# Patient Record
Sex: MALE | Race: WHITE | Employment: FULL TIME | URBAN - METROPOLITAN AREA
[De-identification: names, ages, dates, MRNs, and addresses within clinical notes are randomized per-mention and may not be internally consistent; named-entity substitution may affect disease eponyms.]

---

## 2018-01-27 PROBLEM — Z98.890 POSTSURGICAL STATE, EYE: Status: ACTIVE | Noted: 2018-01-27

## 2018-01-27 PROBLEM — H04.123 DRY EYE SYNDROME OF BILATERAL LACRIMAL GLANDS: Status: ACTIVE | Noted: 2018-01-27

## 2018-01-27 PROBLEM — H40.003 GLAUCOMA SUSPECT, BILATERAL: Status: ACTIVE | Noted: 2018-01-27

## 2018-08-04 PROCEDURE — 82043 UR ALBUMIN QUANTITATIVE: CPT | Performed by: FAMILY MEDICINE

## 2018-08-04 PROCEDURE — 82570 ASSAY OF URINE CREATININE: CPT | Performed by: FAMILY MEDICINE

## 2021-12-31 ENCOUNTER — HOSPITAL ENCOUNTER (EMERGENCY)
Age: 46
Discharge: HOME | End: 2021-12-31
Attending: EMERGENCY MEDICINE
Payer: COMMERCIAL

## 2021-12-31 VITALS
TEMPERATURE: 98 F | RESPIRATION RATE: 18 BRPM | DIASTOLIC BLOOD PRESSURE: 101 MMHG | SYSTOLIC BLOOD PRESSURE: 151 MMHG | BODY MASS INDEX: 43.69 KG/M2 | HEART RATE: 102 BPM | HEIGHT: 69 IN | OXYGEN SATURATION: 98 % | WEIGHT: 295 LBS

## 2021-12-31 DIAGNOSIS — T14.8XXA PUNCTURE WOUND: Primary | ICD-10-CM

## 2021-12-31 PROCEDURE — 99283 EMERGENCY DEPT VISIT LOW MDM: CPT

## 2022-01-01 NOTE — ED PROVIDER NOTES
Patient Seen in: [facility] Emergency Department In [place]      History   Patient presents with:  Laceration/Abrasion    Stated Complaint: finger laceration    Subjective:   HPI    73-year-old male presents with an injury to the second digit of the left visit.        Physical Exam    General:  Vitals as listed. No acute distress   Extremities: There is a puncture wound to the distal aspect of the second digit of the left hand just lateral to the nail. No injury to the nail itself.  Low-grade venous oozing

## 2022-11-29 ENCOUNTER — HOSPITAL ENCOUNTER (EMERGENCY)
Age: 47
Discharge: HOME | End: 2022-11-29
Attending: EMERGENCY MEDICINE
Payer: COMMERCIAL

## 2022-11-29 ENCOUNTER — APPOINTMENT (OUTPATIENT)
Dept: GENERAL RADIOLOGY | Age: 47
End: 2022-11-29
Attending: EMERGENCY MEDICINE
Payer: COMMERCIAL

## 2022-11-29 VITALS
DIASTOLIC BLOOD PRESSURE: 71 MMHG | SYSTOLIC BLOOD PRESSURE: 136 MMHG | BODY MASS INDEX: 47 KG/M2 | HEART RATE: 87 BPM | RESPIRATION RATE: 18 BRPM | WEIGHT: 300 LBS | TEMPERATURE: 98 F | OXYGEN SATURATION: 98 %

## 2022-11-29 DIAGNOSIS — S80.12XA CONTUSION OF LEFT LOWER EXTREMITY, INITIAL ENCOUNTER: Primary | ICD-10-CM

## 2022-11-29 PROCEDURE — 99283 EMERGENCY DEPT VISIT LOW MDM: CPT

## 2022-11-29 PROCEDURE — 73590 X-RAY EXAM OF LOWER LEG: CPT | Performed by: EMERGENCY MEDICINE

## 2022-11-29 NOTE — ED INITIAL ASSESSMENT (HPI)
Pt presents to ed w/ trip and fall one week ago. Denies hitting head and loc/ C/o bruising to LLE.  Takes 325 aspirin

## 2024-06-19 ENCOUNTER — APPOINTMENT (OUTPATIENT)
Dept: CT IMAGING | Facility: HOSPITAL | Age: 49
DRG: 300 | End: 2024-06-19
Attending: HOSPITALIST

## 2024-06-19 ENCOUNTER — APPOINTMENT (OUTPATIENT)
Dept: ULTRASOUND IMAGING | Age: 49
DRG: 300 | End: 2024-06-19
Attending: EMERGENCY MEDICINE

## 2024-06-19 ENCOUNTER — HOSPITAL ENCOUNTER (INPATIENT)
Facility: HOSPITAL | Age: 49
LOS: 1 days | Discharge: HOME | DRG: 300 | End: 2024-06-20
Attending: EMERGENCY MEDICINE | Admitting: INTERNAL MEDICINE

## 2024-06-19 DIAGNOSIS — I82.C12 ACUTE EMBOLISM AND THROMBOSIS OF LEFT INTERNAL JUGULAR VEIN (HCC): Primary | ICD-10-CM

## 2024-06-19 LAB
ANION GAP SERPL CALC-SCNC: 6 MMOL/L (ref 0–18)
ANION GAP SERPL CALC-SCNC: 7 MMOL/L (ref 0–18)
APTT PPP: 130.6 SECONDS (ref 23–36)
APTT PPP: 28 SECONDS (ref 23–36)
BASOPHILS # BLD AUTO: 0.09 X10(3) UL (ref 0–0.2)
BASOPHILS NFR BLD AUTO: 0.6 %
BUN BLD-MCNC: 12 MG/DL (ref 9–23)
BUN BLD-MCNC: 13 MG/DL (ref 9–23)
CALCIUM BLD-MCNC: 9 MG/DL (ref 8.5–10.1)
CALCIUM BLD-MCNC: 9.7 MG/DL (ref 8.5–10.1)
CHLORIDE SERPL-SCNC: 103 MMOL/L (ref 98–112)
CHLORIDE SERPL-SCNC: 106 MMOL/L (ref 98–112)
CO2 SERPL-SCNC: 23 MMOL/L (ref 21–32)
CO2 SERPL-SCNC: 24 MMOL/L (ref 21–32)
CREAT BLD-MCNC: 0.79 MG/DL
CREAT BLD-MCNC: 0.8 MG/DL
EGFRCR SERPLBLD CKD-EPI 2021: 110 ML/MIN/1.73M2 (ref 60–?)
EGFRCR SERPLBLD CKD-EPI 2021: 110 ML/MIN/1.73M2 (ref 60–?)
EOSINOPHIL # BLD AUTO: 0.36 X10(3) UL (ref 0–0.7)
EOSINOPHIL NFR BLD AUTO: 2.6 %
ERYTHROCYTE [DISTWIDTH] IN BLOOD BY AUTOMATED COUNT: 15.7 %
ERYTHROCYTE [DISTWIDTH] IN BLOOD BY AUTOMATED COUNT: 15.8 %
GLUCOSE BLD-MCNC: 102 MG/DL (ref 70–99)
GLUCOSE BLD-MCNC: 109 MG/DL (ref 70–99)
HCT VFR BLD AUTO: 42.2 %
HCT VFR BLD AUTO: 43.4 %
HGB BLD-MCNC: 13.7 G/DL
HGB BLD-MCNC: 13.8 G/DL
IMM GRANULOCYTES # BLD AUTO: 0.1 X10(3) UL (ref 0–1)
IMM GRANULOCYTES NFR BLD: 0.7 %
INR BLD: 0.99 (ref 0.8–1.2)
LYMPHOCYTES # BLD AUTO: 2.65 X10(3) UL (ref 1–4)
LYMPHOCYTES NFR BLD AUTO: 19 %
MCH RBC QN AUTO: 24.8 PG (ref 26–34)
MCH RBC QN AUTO: 24.9 PG (ref 26–34)
MCHC RBC AUTO-ENTMCNC: 31.8 G/DL (ref 31–37)
MCHC RBC AUTO-ENTMCNC: 32.5 G/DL (ref 31–37)
MCV RBC AUTO: 76.6 FL
MCV RBC AUTO: 78.1 FL
MONOCYTES # BLD AUTO: 0.94 X10(3) UL (ref 0.1–1)
MONOCYTES NFR BLD AUTO: 6.7 %
NEUTROPHILS # BLD AUTO: 9.8 X10 (3) UL (ref 1.5–7.7)
NEUTROPHILS # BLD AUTO: 9.8 X10(3) UL (ref 1.5–7.7)
NEUTROPHILS NFR BLD AUTO: 70.4 %
OSMOLALITY SERPL CALC.SUM OF ELEC: 277 MOSM/KG (ref 275–295)
OSMOLALITY SERPL CALC.SUM OF ELEC: 282 MOSM/KG (ref 275–295)
PLATELET # BLD AUTO: 391 10(3)UL (ref 150–450)
PLATELET # BLD AUTO: 432 10(3)UL (ref 150–450)
POTASSIUM SERPL-SCNC: 4.1 MMOL/L (ref 3.5–5.1)
POTASSIUM SERPL-SCNC: 4.2 MMOL/L (ref 3.5–5.1)
PROTHROMBIN TIME: 13.1 SECONDS (ref 11.6–14.8)
RBC # BLD AUTO: 5.51 X10(6)UL
RBC # BLD AUTO: 5.56 X10(6)UL
SODIUM SERPL-SCNC: 133 MMOL/L (ref 136–145)
SODIUM SERPL-SCNC: 136 MMOL/L (ref 136–145)
WBC # BLD AUTO: 13.9 X10(3) UL (ref 4–11)
WBC # BLD AUTO: 14.2 X10(3) UL (ref 4–11)

## 2024-06-19 PROCEDURE — 93971 EXTREMITY STUDY: CPT | Performed by: EMERGENCY MEDICINE

## 2024-06-19 PROCEDURE — 71275 CT ANGIOGRAPHY CHEST: CPT | Performed by: HOSPITALIST

## 2024-06-19 PROCEDURE — 70492 CT SFT TSUE NCK W/O & W/DYE: CPT | Performed by: HOSPITALIST

## 2024-06-19 RX ORDER — MELATONIN
1000 DAILY
Status: DISCONTINUED | OUTPATIENT
Start: 2024-06-19 | End: 2024-06-20

## 2024-06-19 RX ORDER — ACETAMINOPHEN 500 MG
500 TABLET ORAL EVERY 4 HOURS PRN
Status: DISCONTINUED | OUTPATIENT
Start: 2024-06-19 | End: 2024-06-20

## 2024-06-19 RX ORDER — ENALAPRIL MALEATE 5 MG/1
5 TABLET ORAL 2 TIMES DAILY
Status: DISCONTINUED | OUTPATIENT
Start: 2024-06-19 | End: 2024-06-20

## 2024-06-19 RX ORDER — HEPARIN SODIUM 1000 [USP'U]/ML
80 INJECTION, SOLUTION INTRAVENOUS; SUBCUTANEOUS ONCE
Status: COMPLETED | OUTPATIENT
Start: 2024-06-19 | End: 2024-06-19

## 2024-06-19 RX ORDER — ONDANSETRON 2 MG/ML
4 INJECTION INTRAMUSCULAR; INTRAVENOUS EVERY 6 HOURS PRN
Status: DISCONTINUED | OUTPATIENT
Start: 2024-06-19 | End: 2024-06-20

## 2024-06-19 RX ORDER — PROCHLORPERAZINE EDISYLATE 5 MG/ML
5 INJECTION INTRAMUSCULAR; INTRAVENOUS EVERY 8 HOURS PRN
Status: DISCONTINUED | OUTPATIENT
Start: 2024-06-19 | End: 2024-06-20

## 2024-06-19 RX ORDER — ASPIRIN 325 MG
325 TABLET ORAL DAILY
Status: DISCONTINUED | OUTPATIENT
Start: 2024-06-20 | End: 2024-06-20

## 2024-06-19 RX ORDER — CLINDAMYCIN HYDROCHLORIDE 150 MG/1
CAPSULE ORAL 3 TIMES DAILY
COMMUNITY

## 2024-06-19 RX ORDER — HEPARIN SODIUM AND DEXTROSE 10000; 5 [USP'U]/100ML; G/100ML
INJECTION INTRAVENOUS CONTINUOUS
Status: DISCONTINUED | OUTPATIENT
Start: 2024-06-19 | End: 2024-06-20

## 2024-06-19 RX ORDER — HYDROCODONE BITARTRATE AND ACETAMINOPHEN 5; 325 MG/1; MG/1
1 TABLET ORAL EVERY 6 HOURS PRN
COMMUNITY
End: 2024-06-20

## 2024-06-19 RX ORDER — HEPARIN SODIUM AND DEXTROSE 10000; 5 [USP'U]/100ML; G/100ML
18 INJECTION INTRAVENOUS ONCE
Status: COMPLETED | OUTPATIENT
Start: 2024-06-19 | End: 2024-06-19

## 2024-06-19 RX ORDER — FLUTICASONE PROPIONATE 50 MCG
2 SPRAY, SUSPENSION (ML) NASAL DAILY
Status: DISCONTINUED | OUTPATIENT
Start: 2024-06-19 | End: 2024-06-20

## 2024-06-19 RX ORDER — CETIRIZINE HYDROCHLORIDE 10 MG/1
10 TABLET ORAL DAILY
Status: DISCONTINUED | OUTPATIENT
Start: 2024-06-20 | End: 2024-06-20

## 2024-06-19 RX ORDER — CLINDAMYCIN HYDROCHLORIDE 150 MG/1
150 CAPSULE ORAL 3 TIMES DAILY
Status: DISCONTINUED | OUTPATIENT
Start: 2024-06-19 | End: 2024-06-20

## 2024-06-19 RX ORDER — CARVEDILOL 12.5 MG/1
37.5 TABLET ORAL 2 TIMES DAILY WITH MEALS
Status: DISCONTINUED | OUTPATIENT
Start: 2024-06-19 | End: 2024-06-20

## 2024-06-19 RX ORDER — HYDROCODONE BITARTRATE AND ACETAMINOPHEN 5; 325 MG/1; MG/1
1 TABLET ORAL EVERY 6 HOURS PRN
Status: DISCONTINUED | OUTPATIENT
Start: 2024-06-19 | End: 2024-06-20

## 2024-06-19 NOTE — CONSULTS
[facility] Consult Note    Reason for Consult: Left Internal Jugular Clot   Medical Record Number: [id number]   CSN: [id number]   Referring Physician: No ref. provider found  PCP: Ld Tai MD    History of Present Illness: 49M with a PMH of Cardiomyopathy and pacemaker presented on 6/19/24 with LUE swelling. On 6/17/24, he met with his dentist for left cheek swelling. No abscess per report. But there was suspicion for a dental infection and he was started on clindamycin with improvement in cheek swelling. He noticed LUE swellling, warmth and tingling around the same time. LUE doppler showed an age indeterminate DVT in the left internal jugular vein. CTA chest showed no PE. There was an 8 mm hypodense focus in the right hepatic lobe. CT neck did not show any suspicious LN or significant clot burden.     No personal or FH of VTE. No hormone use. No recent IV placement or trauma.     Review of Systems: 12 Point ROS was completed and pertinent positives are in the HPI    Medications:    Current Facility-Administered Medications:     heparin (Porcine) 89187 units/250mL infusion PE/DVT/THROMBUS CONTINUOUS, 200-3,000 Units/hr, Intravenous, Continuous    acetaminophen (Tylenol Extra Strength) tab 500 mg, 500 mg, Oral, Q4H PRN    ondansetron (Zofran) 4 MG/2ML injection 4 mg, 4 mg, Intravenous, Q6H PRN    prochlorperazine (Compazine) 10 MG/2ML injection 5 mg, 5 mg, Intravenous, Q8H PRN    cetirizine (ZyrTEC) tab 10 mg, 10 mg, Oral, Daily    aspirin tab 325 mg, 325 mg, Oral, Daily    carvedilol (Coreg) tab 37.5 mg, 37.5 mg, Oral, BID with meals    cholecalciferol (Vitamin D3) tab 1,000 Units, 1,000 Units, Oral, Daily    clindamycin (Cleocin) cap 150 mg, 150 mg, Oral, TID    fluticasone propionate (Flonase) 50 MCG/ACT nasal suspension 2 spray, 2 spray, Nasal, Daily    enalapril (Vasotec) tab 5 mg, 5 mg, Oral, BID    HYDROcodone-acetaminophen (Norco) 5-325 MG per tab 1 tablet, 1 tablet, Oral, Q6H  PRN    Past Medical History:    Allergic rhinitis due to other allergen    Anesthesia complication    Back problem    Bilateral dry eyes    Cardiomyopathy (HCC)    Dilated cardiomyopathy (HCC)    with pacemaker    Failure of implantable cardioverter-defibrillator (ICD) lead    Glaucoma suspect, bilateral    + fh     Mitral regurgitation    Obesity, unspecified     Past Surgical History:   Procedure Laterality Date    Cardiac catherterization (pbp)  09/11/2006    normal arteries    Cardiac defibrillator placement      Cardiac pacemaker placement      Fracture surgery      Laser in situ keratomileusis Bilateral 2005    Other surgical history  07/21/2006    medtronic BiV-ICD    Other surgical history      traumatic amputation of the toe. LT 4th    Other surgical history Right 2015    Rt knee arthroscopy, Dr Gallo     Social History     Socioeconomic History    Marital status: Single   Tobacco Use    Smoking status: Never    Smokeless tobacco: Former     Types: Snuff     Quit date: 1/1/2005   Vaping Use    Vaping status: Never Used   Substance and Sexual Activity    Alcohol use: No    Drug use: No    Sexual activity: Never      Family History   Problem Relation Age of Onset    Heart Disorder Mother         DCM    Alcohol and Other Disorders Associated Father     Glaucoma Father     Other (Other) Father         AAA    Stroke Maternal Grandmother     Cancer Maternal Grandfather         Bone    Heart Disease Paternal Grandmother         CABG    Diabetes Paternal Grandfather     Heart Disease Paternal Grandfather         CABG    Breast Cancer Maternal Aunt     Heart Disease Maternal Uncle         rheumatic fever    Depression Sister         SAD       Physical Exam  /65 (BP Location: Right arm)   Pulse 78   Temp 98.1 °F (36.7 °C) (Oral)   Resp 20   Ht 1.778 m (5' 10\")   Wt 136.1 kg (300 lb)   SpO2 96%   BMI 43.05 kg/m²    General: NAD, AOX3  HEENT: clear op, mmm, no jvd. EOM intact, no scleral icterus   LN: no  supraclavicular, infraclavicular, axillary or inguinal LAD  CV: RRR S1S2 no murmurs  Extremities: LUE swelling  Lungs: CTAB, no increased work of breathing  Abd: soft nt nd +BS no hepatosplenomegaly  Neuro: CN: II-XII grossly intact  Psych: Normal Mood and affect     Results:  Lab Results   Component Value Date    WBC 13.6 (H) 06/20/2024    HGB 13.0 06/20/2024    HCT 40.7 06/20/2024    MCV 79.6 (L) 06/20/2024    .0 06/20/2024     Lab Results   Component Value Date     06/20/2024    K 4.0 06/20/2024    CO2 24.0 06/20/2024     06/20/2024    BUN 12 06/20/2024    ALB 4.5 12/04/2021       Radiology: reviewed     Assessment and Plan:  [facility] DVT  -dx 6/19/24. Possibly related to recent left sided dental infection vs. Abscess. We will check an APLS panel and JAK2 while admitted. In clinic, we can check a Factor V Leiden, [id number], Protein C/S and ATIII. We will plan on 3 months of anticoagulation followed by a repeat LUE US. Duration of anticoagulation will depend on his hypercoagulable work up.   -I asked him to follow up with cardiologist in regards to his ASA dosing. He is currently on 325 mg daily but can likely go down to 81 mg  -Needs up to date colon cancer screening with PCP    8 mm R Hepatic lobe lesion: will check an US abdomen.     Dental Infection: abx per primary     Dispo  Ok to discharge from a hematology standpoint  See me in 3 months after repeat LUE Doppler  Follow up with cardiology       [facility]

## 2024-06-19 NOTE — H&P
.CC:   Chief Complaint   Patient presents with    Deep Vein Thrombosis        PCP: Ld Tai MD    History of Present Illness: Patient is a 49 year old male with PMH sig for dilated cardiomyopathy, mr, pacemaker (biv-ICD) who presented with left arm swelling that he has noticed for about the past 4 days.  He was noticing that his left arm and his hand all felt tighter than on his right.  He thought initially it may be related to his sleeping position in which he sleeps on his left arm.  Also about 4 days ago he began to notice some dental pain in his left upper jaw.  He went and saw the dentist the next day and he was started on clindamycin and Peridex topical treatment and instructed to follow-up with periodontist.  He reports having had problems with his dentition in that area before.  He also had noted that his left cheek was red and swollen but since taking the antibiotic that had improved.    Nonetheless his left arm continue to worsen through to today when he noticed that his left arm seemed twice as big as his right arm.  No pain or swelling in the legs or his other arm.  No chest pain or shortness of breath.  No fevers or chills currently.  Although he did have a temperature when he had the dental infection earlier in the week.      PMH  Past Medical History:    Allergic rhinitis due to other allergen    Bilateral dry eyes    Dilated cardiomyopathy (HCC)    with pacemaker    Failure of implantable cardioverter-defibrillator (ICD) lead    Glaucoma suspect, bilateral    + fh     Mitral regurgitation    Obesity, unspecified        PSH  Past Surgical History:   Procedure Laterality Date    Cardiac catherterization (pbp)  9/11/2006    normal arteries    Laser in situ keratomileusis Bilateral 2005    Other surgical history  7/21/2006    medtronic BiV-ICD    Other surgical history      traumatic amputation of the toe. LT 4th    Other surgical history Right 2015    Rt knee arthroscopy, Dr Gallo         ALL:  Allergies   Allergen Reactions    Ampicillin NAUSEA ONLY     SE only, no allergies to other PCN derivatives    Nsaids      H/o cardiomyopathy    Perflutren [Propane] RASH     Pt states he has had Perflutren twice in his life and each time, he developed severe back pain that lasted 20 min         Home Medications:  Outpatient Medications Marked as Taking for the 6/19/24 encounter (Hospital Encounter)   Medication Sig Dispense Refill    HYDROcodone-acetaminophen 5-325 MG Oral Tab Take 1 tablet by mouth every 6 (six) hours as needed for Pain.      clindamycin 150 MG Oral Cap Take by mouth 3 (three) times daily.      Enalapril Maleate 5 MG Oral Tab Take 1 tablet (5 mg total) by mouth 2 (two) times daily.      carvedilol 25 MG Oral Tab Take 1 1/2 tablets BID      ALLEGRA 180 MG OR TABS 1 TABLET DAILY      ASPIRIN 325 MG OR TABS 1 TABLET DAILY           Soc Hx  Social History     Tobacco Use    Smoking status: Never    Smokeless tobacco: Former     Types: Snuff     Quit date: 1/1/2005   Substance Use Topics    Alcohol use: No        Fam Hx  Family History   Problem Relation Age of Onset    Heart Disorder Mother         DCM    Alcohol and Other Disorders Associated Father     Glaucoma Father     Other (Other) Father         AAA    Stroke Maternal Grandmother     Cancer Maternal Grandfather         Bone    Heart Disease Paternal Grandmother         CABG    Diabetes Paternal Grandfather     Heart Disease Paternal Grandfather         CABG    Breast Cancer Maternal Aunt     Heart Disease Maternal Uncle         rheumatic fever    Depression Sister         SAD       Review of Systems  Comprehensive ROS reviewed and negative except for what's stated above.       OBJECTIVE:  /84   Pulse 85   Temp 98.2 °F (36.8 °C) (Oral)   Resp 18   Ht 5' 10\" (1.778 m)   Wt 300 lb (136.1 kg)   SpO2 95%   BMI 43.05 kg/m²     Gen- NAD, appears stated age  HEENT- no visible swelling along L upper dentition currently. No L cheek  swelling or erythema.  Lymph- no cervical LAD  CV- RRR no murmurs. No FELIZ  Lungs- CTAB, good respiratory effort  Abd- soft, ntnd, no organomegaly, BS+  Derm- no rashes  MSK- L arm perhaps diffusely enlarged compared to R, difficult to tell  Neuro- A&OX3, no focal deficits      Diagnostic Data:    CBC/Chem  Recent Labs   Lab 06/19/24  1340   WBC 13.9*   HGB 13.8   MCV 78.1*   .0   INR 0.99       Recent Labs   Lab 06/19/24  1347   *   K 4.2      CO2 24.0   BUN 13   CREATSERUM 0.79   *   CA 9.7       Radiology: US VENOUS DOPPLER ARM LEFT - DIAG IMG (CPT=93971)    Result Date: 6/19/2024  PROCEDURE:  US VENOUS DOPPLER ARM LEFT - DIAG IMG (CPT=93971)  COMPARISON:  None.  INDICATIONS:  eval for DVT, left arm pain and swelling  TECHNIQUE:  Real time, grey scale, and duplex ultrasound was used to evaluate the upper extremity venous system. B-mode two-dimensional images of the vascular structures, Doppler spectral analysis, and color flow.  Doppler imaging were performed.  The following veins were imaged:  Subclavian, Jugular, Axillary, Brachial, Basilic, Cephalic, and the contralateral Subclavian and Jugular.  PATIENT STATED HISTORY: (As transcribed by Technologist)  Left arm pain and swelling.    FINDINGS:  EXTREMITY:  There is age indeterminate thrombus noted within the left internal jugular vein. THROMBI:  None visible. COMPRESSION:  Normal compressibility, phasicity, and augmentation of the subclavian, , axillary, brachial, basilic, and cephalic veins. OTHER:  Negative.            CONCLUSION:  Age-indeterminate thrombus within the left internal jugular vein.   LOCATION:  [facility]   Dictated by (CST): Kaila Edgar MD on 6/19/2024 at 12:38 PM     Finalized by (CST): Kaila Edgar MD on 6/19/2024 at 12:38 PM          Available outpatient records reviewed--    ASSESSMENT / PLAN:     49-year-old man with history of dilated cardiomyopathy, MR, BiV ICD who presented with diffuse left arm swelling  and found to have left IJ thrombus in the setting of recent dental infection.    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  Had touch base with hematology and have ordered a CTA of the chest and CT of neck.      Recent dental infection  -Continue clinda as previously prescribed for now unless other forthcoming information from CT that is ordered    Dilated cardiomyopathy, MR, BiV ICD  - Resume home medications    Prophy- on hep gtt          Christina Devries MD  [facility]  Pager [phone]  Answering Service number: [phone]

## 2024-06-19 NOTE — ED QUICK NOTES
Orders for admission, patient is aware of plan and ready to go upstairs. Any questions, please call ED RN Negin at extension [phone].     Patient Covid vaccination status: Fully vaccinated     COVID Test Ordered in ED: None    COVID Suspicion at Admission: N/A    Running Infusions:    continuous dose heparin          Mental Status/LOC at time of transport: a/ox4    Other pertinent information: pt will arrive via EAS  CIWA score: N/A   NIH score:  N/A

## 2024-06-20 ENCOUNTER — APPOINTMENT (OUTPATIENT)
Dept: ULTRASOUND IMAGING | Facility: HOSPITAL | Age: 49
DRG: 300 | End: 2024-06-20
Attending: NURSE PRACTITIONER

## 2024-06-20 VITALS
RESPIRATION RATE: 19 BRPM | HEIGHT: 70 IN | BODY MASS INDEX: 42.95 KG/M2 | SYSTOLIC BLOOD PRESSURE: 136 MMHG | TEMPERATURE: 98 F | HEART RATE: 84 BPM | DIASTOLIC BLOOD PRESSURE: 75 MMHG | WEIGHT: 300 LBS | OXYGEN SATURATION: 96 %

## 2024-06-20 LAB
ANION GAP SERPL CALC-SCNC: 8 MMOL/L (ref 0–18)
APTT PPP: 69.7 SECONDS (ref 23–36)
BASOPHILS # BLD AUTO: 0.08 X10(3) UL (ref 0–0.2)
BASOPHILS NFR BLD AUTO: 0.6 %
BUN BLD-MCNC: 12 MG/DL (ref 9–23)
CALCIUM BLD-MCNC: 8.8 MG/DL (ref 8.5–10.1)
CHLORIDE SERPL-SCNC: 104 MMOL/L (ref 98–112)
CO2 SERPL-SCNC: 24 MMOL/L (ref 21–32)
CREAT BLD-MCNC: 0.75 MG/DL
EGFRCR SERPLBLD CKD-EPI 2021: 112 ML/MIN/1.73M2 (ref 60–?)
EOSINOPHIL # BLD AUTO: 0.4 X10(3) UL (ref 0–0.7)
EOSINOPHIL NFR BLD AUTO: 2.9 %
ERYTHROCYTE [DISTWIDTH] IN BLOOD BY AUTOMATED COUNT: 15.7 %
GLUCOSE BLD-MCNC: 105 MG/DL (ref 70–99)
HCT VFR BLD AUTO: 40.7 %
HGB BLD-MCNC: 13 G/DL
IMM GRANULOCYTES # BLD AUTO: 0.07 X10(3) UL (ref 0–1)
IMM GRANULOCYTES NFR BLD: 0.5 %
LYMPHOCYTES # BLD AUTO: 3.99 X10(3) UL (ref 1–4)
LYMPHOCYTES NFR BLD AUTO: 29.4 %
MCH RBC QN AUTO: 25.4 PG (ref 26–34)
MCHC RBC AUTO-ENTMCNC: 31.9 G/DL (ref 31–37)
MCV RBC AUTO: 79.6 FL
MONOCYTES # BLD AUTO: 0.86 X10(3) UL (ref 0.1–1)
MONOCYTES NFR BLD AUTO: 6.3 %
NEUTROPHILS # BLD AUTO: 8.16 X10 (3) UL (ref 1.5–7.7)
NEUTROPHILS # BLD AUTO: 8.16 X10(3) UL (ref 1.5–7.7)
NEUTROPHILS NFR BLD AUTO: 60.3 %
OSMOLALITY SERPL CALC.SUM OF ELEC: 282 MOSM/KG (ref 275–295)
PLATELET # BLD AUTO: 360 10(3)UL (ref 150–450)
POTASSIUM SERPL-SCNC: 4 MMOL/L (ref 3.5–5.1)
RBC # BLD AUTO: 5.11 X10(6)UL
SODIUM SERPL-SCNC: 136 MMOL/L (ref 136–145)
WBC # BLD AUTO: 13.6 X10(3) UL (ref 4–11)

## 2024-06-20 PROCEDURE — 76700 US EXAM ABDOM COMPLETE: CPT | Performed by: NURSE PRACTITIONER

## 2024-06-20 PROCEDURE — 99255 IP/OBS CONSLTJ NEW/EST HI 80: CPT | Performed by: INTERNAL MEDICINE

## 2024-06-20 RX ORDER — RIVAROXABAN 15 MG-20MG
15 KIT ORAL 2 TIMES DAILY WITH MEALS
Qty: 1 EACH | Refills: 0 | Status: SHIPPED | OUTPATIENT
Start: 2024-06-20 | End: 2024-07-11

## 2024-06-20 NOTE — PLAN OF CARE
Assumed care at 1651  A/O x4   On RA   Norco x1  Hospitalist consulted   Heparin gtt infusing per order  Up SB  Regular diet  Call light within reach. Fall precautions in place  Patient updated on POC

## 2024-06-20 NOTE — PLAN OF CARE
Assumed care @1930  A/Ox4, RA, NSR/ST on tele.  SpO2 sats low overnight; pt declined nasal canula  Pain addressed w/ PRN meds.  Heparin gtt therapeutic and infusing per protocol. Next draw tomorrow morning.  Heme/Onc on board.  Pt updated on POC.

## 2024-06-20 NOTE — ED PROVIDER NOTES
Patient Seen in: [facility]      History     Chief Complaint   Patient presents with    Deep Vein Thrombosis     Stated Complaint: left arm swelling and tingling since monday    Subjective:   HPI    This is a 49-year-old male with history of dilated cardiomyopathy, AICD, presents emergency room for evaluation of left upper arm swelling and tenderness.  He states he started to notice symptoms Monday.  Patient denies any trauma, denies any heavy lifting.  Denies redness or warmth to the extremity.  States sometimes he will feel a tingling sensation to the arm if it is hanging by side too long.  Denies any weakness or difficulty moving the digits, denies fine motor movement difficulties.  Denies any numbness tingling to the right arm.  Denies any swelling or pain in the right arm.  Denies chest pain or shortness of breath.  Denies headache or neck pain.  Denies any trauma.    Objective:   Past Medical History:    Allergic rhinitis due to other allergen    Anesthesia complication    Back problem    Bilateral dry eyes    Cardiomyopathy (HCC)    Dilated cardiomyopathy (HCC)    with pacemaker    Failure of implantable cardioverter-defibrillator (ICD) lead    Glaucoma suspect, bilateral    + fh     Mitral regurgitation    Obesity, unspecified              Past Surgical History:   Procedure Laterality Date    Cardiac catherterization (pbp)  09/11/2006    normal arteries    Cardiac defibrillator placement      Cardiac pacemaker placement      Fracture surgery      Laser in situ keratomileusis Bilateral 2005    Other surgical history  07/21/2006    medtronic BiV-ICD    Other surgical history      traumatic amputation of the toe. LT 4th    Other surgical history Right 2015    Rt knee arthroscopy, Dr Gallo                Social History     Socioeconomic History    Marital status: Single   Tobacco Use    Smoking status: Never    Smokeless tobacco: Former     Types: Snuff     Quit date: 1/1/2005   Vaping Use     Vaping status: Never Used   Substance and Sexual Activity    Alcohol use: No    Drug use: No    Sexual activity: Never     Social Determinants of Health     Financial Resource Strain: Low Risk  (3/25/2024)    [facility]    Overall Financial Resource Strain (CARDIA)     Difficulty of Paying Living Expenses: Not very hard   Food Insecurity: No Food Insecurity (6/19/2024)    Food Insecurity     Food Insecurity: Never true   Transportation Needs: No Transportation Needs (6/19/2024)    Transportation Needs     Lack of Transportation: No   Housing Stability: Low Risk  (6/19/2024)    Housing Stability     Housing Instability: No              Review of Systems    Positive for stated complaint: left arm swelling and tingling since monday  Other systems are as noted in HPI.  Constitutional and vital signs reviewed.      All other systems reviewed and negative except as noted above.    Physical Exam     ED Triage Vitals [06/19/24 1117]   /68   Pulse 89   Resp 16   Temp 98.2 °F (36.8 °C)   Temp src Oral   SpO2 97 %   O2 Device None (Room air)       Current Vitals:   Vital Signs  BP: (!) 132/93  Pulse: 90  Resp: 23  Temp: 99.5 °F (37.5 °C)  Temp src: Oral  MAP (mmHg): 100    Oxygen Therapy  SpO2: 96 %  O2 Device: None (Room air)  Pulse Oximetry Type: Continuous  Pulse Ox Probe Location: Right hand            Physical Exam    GENERAL: Patient is awake, alert, well-appearing, in no acute distress.  HEENT: no scleral icterus.  Mucous membranes are moist, oropharynx is clear.  Scalp is atraumatic.  NECK: Neck is supple, there is no nuchal rigidity.  No carotid bruits.  No masses.  Trachea midline.  No cervical lymphadenopathy.  HEART: Regular rate and rhythm, no murmurs.  LUNGS: Clear to auscultation bilaterally.  No Rales, no rhonchi, no wheezing, no stridor.  ABDOMEN: Soft, nondistended,non tender  EXTREMITIES: No tenderness or swelling to the right upper  extremity.  There is mild swelling to the left bicipital area without erythema warmth, no tenderness.  No tenderness or swelling to the elbow.  No tenderness or swelling to the forearm wrist or hand.  All compartments are soft of the upper extremity.  Radial and ulnar pulse present and equal bilaterally.  No swelling to the lower extremities., no calf tenderness, dorsal pedal pulses present and equal bilaterally.          ED Course     Labs Reviewed   BASIC METABOLIC PANEL (8) - Abnormal; Notable for the following components:       Result Value    Glucose 109 (*)     Sodium 133 (*)     All other components within normal limits   BASIC METABOLIC PANEL (8) - Abnormal; Notable for the following components:    Glucose 102 (*)     All other components within normal limits   CBC, PLATELET; NO DIFFERENTIAL - Abnormal; Notable for the following components:    WBC 14.2 (*)     MCV 76.6 (*)     MCH 24.9 (*)     All other components within normal limits   CBC W/ DIFFERENTIAL - Abnormal; Notable for the following components:    WBC 13.9 (*)     MCV 78.1 (*)     MCH 24.8 (*)     Neutrophil Absolute Prelim 9.80 (*)     Neutrophil Absolute 9.80 (*)     All other components within normal limits   PROTHROMBIN TIME (PT) - Normal   PTT, ACTIVATED - Normal   CBC WITH DIFFERENTIAL WITH PLATELET    Narrative:     The following orders were created for panel order CBC With Differential With Platelet.  Procedure                               Abnormality         Status                     ---------                               -----------         ------                     CBC W/ DIFFERENTIAL[[id number]]          Abnormal            Final result                 Please view results for these tests on the individual orders.   PTT, ACTIVATED             A total of 35 minutes of critical care time (exclusive of billable procedures) was administered to manage the patient's critical imaging findings due to his thrombus left internal jugular.  This  involved direct patient intervention, complex decision making, and/or extensive discussions with the patient, family, and clinical staff.           MDM        Differential diagnosis before testing includes but not limited to DVT, muscle strain, cellulitis, which is a medical condition that poses a threat to life/function    Past Medical History/comorbidities-as noted in HPI      Radiographic images  I personally reviewed the radiographs and my individual interpretation shows ultrasound reviewed, no DVT was noted  I also reviewed the official reports that showed ultrasound with age-indeterminate thrombus within the left internal jugular vein    Discussion of management (consult/physicians, social work, pharmacy,ect) duly hospitalist Dr. Devries, hematology Dr. Fritz    Medications Provided: IV heparin infusion    Course of Events during Emergency Room Visit include patient had ultrasound of the left upper extremity which did reveal thrombus within the left internal jugular vein.  IV was established, CBC chemistry and coags performed.  CBC white count 13.9, hemoglobin 13.8, platelet 432, chemistry was unremarkable.  Patient was started on heparin, discussed with connor hospitalist.  Discussed with hematology as well.  No further recommendations were given at this time, patient will be admitted for further evaluation and treatment.  Discussed all results with the patient, he agrees with plan  Shared decision making was utilized           Disposition:    Admission  I have discussed with the patient the results of test, differential diagnosis, and treatment plan. They expressed clear understanding of these instructions and agrees to the plan provided.       Note to patient: The 21st Century Cures Act makes medical notes like these available to patients in the interest of transparency. However, this is a medical document intended as peer to peer communication. It is written in medical language and may contain abbreviations  or verbiage that are unfamiliar. It may appear blunt or direct. Medical documents are intended to carry relevant information, facts as evident, and the clinical opinion of the practitioner.           Admission disposition: 6/19/2024  1:45 PM                                        Medical Decision Making      Disposition and Plan     Clinical Impression:  1. Acute embolism and thrombosis of left internal jugular vein (HCC)         Disposition:  Admit  6/19/2024  1:45 pm    Follow-up:  No follow-up provider specified.        Medications Prescribed:  Current Discharge Medication List                            Hospital Problems       Present on Admission  Date Reviewed: 3/16/2022            ICD-10-CM Noted POA    * (Principal) Acute embolism and thrombosis of left internal jugular vein (HCC) I82.C12 6/19/2024 Unknown

## 2024-06-20 NOTE — PLAN OF CARE
Assumed care at 0730  Orientated x4, NSR (ST with movement), RA   Complaint of a headache; tolerating pain management     Heparin monitored and maintained   NPO; US of abdomen   Needs met     Plan for possible discharge if cleared by consults         NURSING DISCHARGE NOTE    Discharged Home via Wheelchair.  Accompanied by Support staff  Belongings Taken by patient/family.    All consults cleared for discharge   Xarelto delivered at bedside by pharmacy   Patient educated on discharge paperwork   No further questions

## 2024-06-20 NOTE — PROGRESS NOTES
[facility]   part of [facility] Hospitalist Progress Note     Chuck Kirby Jr. Patient Status:  Inpatient    1975 MRN [id number]   Location [facility] 7NE-A Attending Christina Devries MD   Hosp Day # 1 PCP Ld Tai MD         Chief Complaint   Patient presents with    Fu: Deep Vein Thrombosis        Subjective:     Patient seen and examined.   No issues overnight  Having some neck pain  But otherwise wants to go home    Objective:    Review of Systems:   10 point ROS completed and was negative, except for pertinent positive and negatives stated in subjective.    Vital signs:  Temp:  [97.8 °F (36.6 °C)-99.5 °F (37.5 °C)] 97.8 °F (36.6 °C)  Pulse:  [] 83  Resp:  [13-26] 13  BP: (113-139)/(65-93) 131/69  SpO2:  [94 %-97 %] 96 %    Physical Exam:    General: No acute distress.   HEENT:  EOMI, PERRLA, OP clear  Respiratory: Clear to auscultation bilaterally. No wheezes. No rhonchi.  Cardiovascular: S1, S2. Regular rate and rhythm. No murmurs.  Abdomen: Soft, nontender, nondistended.  Positive bowel sounds. No rebound or guarding.  Extremities: No edema.  Neuro:  Grossly non focal, no motor deficits.        Diagnostic Data:    Labs:  Recent Labs   Lab 24  1340 24  1726 24  0616   WBC 13.9* 14.2* 13.6*   HGB 13.8 13.7 13.0   MCV 78.1* 76.6* 79.6*   .0 391.0 360.0   INR 0.99  --   --        Recent Labs   Lab 24  1347 24  1726 24  0616   * 102* 105*   BUN 13 12 12   CREATSERUM 0.79 0.80 0.75   CA 9.7 9.0 8.8   * 136 136   K 4.2 4.1 4.0    106 104   CO2 24.0 23.0 24.0       Estimated Creatinine Clearance: 125.7 mL/min (based on SCr of 0.75 mg/dL).    Recent Labs   Lab 24  1340   PTP 13.1   INR 0.99            COVID-19 Lab Results    COVID-19  Lab Results   Component Value Date    COVID19 NOT DETECTED 2021    COVID19 NOT DETECTED 2021       Pro-Calcitonin  No results for input(s): \"PCT\" in the  last 168 hours.    Cardiac  No results for input(s): \"TROP\", \"PBNP\" in the last 168 hours.    Creatinine Kinase  No results for input(s): \"CK\" in the last 168 hours.    Inflammatory Markers  No results for input(s): \"CRP\", \"GHISLAINE\", \"LDH\", \"DDIMER\" in the last 168 hours.    Imaging: Imaging data reviewed in Epic.    Medications:    cetirizine  10 mg Oral Daily    aspirin  325 mg Oral Daily    carvedilol  37.5 mg Oral BID with meals    cholecalciferol  1,000 Units Oral Daily    clindamycin  150 mg Oral TID    fluticasone propionate  2 spray Nasal Daily    enalapril  5 mg Oral BID       Assessment & Plan:      49-year-old man with history of dilated cardiomyopathy, MR, BiV ICD who presented with diffuse left arm swelling and found to have left IJ thrombus in the setting of recent dental infection.     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  Had touch base with hematology and have ordered a CTA of the chest and CT of neck.       Recent dental infection  -Continue clinda as previously prescribed for now unless other forthcoming information from CT that is ordered  -CT neck without acute abnormality     Dilated cardiomyopathy, MR, BiV ICD  - Resume home medications    Incidentaloma - liver  -seen on CT  -abd us ordered       Prophy- on hep gtt          Elli Jones Hospitalist  Pager [phone]  Answering Service number: [phone]

## 2024-06-20 NOTE — PAYOR COMM NOTE
--------------  ADMISSION REVIEW     Payor: [employer]  Subscriber #:  [id number]  Authorization Number: [id number]    Admit date: 6/19/24  Admit time:  4:45 PM       REVIEW DOCUMENTATION:    Patient Seen in: [place] [id number]      History     Chief Complaint   Patient presents with    Deep Vein Thrombosis     Stated Complaint: left arm swelling and tingling since monday    Subjective:   HPI    This is a 49-year-old male with history of dilated cardiomyopathy, AICD, presents emergency room for evaluation of left upper arm swelling and tenderness.  He states he started to notice symptoms Monday.  Patient denies any trauma, denies any heavy lifting.  Denies redness or warmth to the extremity.  States sometimes he will feel a tingling sensation to the arm if it is hanging by side too long.  Denies any weakness or difficulty moving the digits, denies fine motor movement difficulties.  Denies any numbness tingling to the right arm.  Denies any swelling or pain in the right arm.  Denies chest pain or shortness of breath.  Denies headache or neck pain.  Denies any trauma.    Objective:   Past Medical History:    Allergic rhinitis due to other allergen    Anesthesia complication    Back problem    Bilateral dry eyes    Cardiomyopathy (HCC)    Dilated cardiomyopathy (HCC)    with pacemaker    Failure of implantable cardioverter-defibrillator (ICD) lead    Glaucoma suspect, bilateral    + fh     Mitral regurgitation    Obesity, unspecified     Past Surgical History:   Procedure Laterality Date    Cardiac catherterization (pbp)  09/11/2006    normal arteries    Cardiac defibrillator placement      Cardiac pacemaker placement      Fracture surgery      Laser in situ keratomileusis Bilateral 2005    Other surgical history  07/21/2006    medtronic BiV-ICD    Other surgical history      traumatic amputation of the toe. LT 4th    Other surgical history Right 2015    Rt knee arthroscopy, Dr Gallo         Physical  Exam     ED Triage Vitals [06/19/24 1117]   /68   Pulse 89   Resp 16   Temp 98.2 °F (36.8 °C)   Temp src Oral   SpO2 97 %   O2 Device None (Room air)       Current Vitals:   Vital Signs  BP: (!) 132/93  Pulse: 90  Resp: 23  Temp: 99.5 °F (37.5 °C)  Temp src: Oral  MAP (mmHg): 100    Oxygen Therapy  SpO2: 96 %  O2 Device: None (Room air)  Pulse Oximetry Type: Continuous  Pulse Ox Probe Location: Right hand    Physical Exam    GENERAL: Patient is awake, alert, well-appearing, in no acute distress.  HEENT: no scleral icterus.  Mucous membranes are moist, oropharynx is clear.  Scalp is atraumatic.  NECK: Neck is supple, there is no nuchal rigidity.  No carotid bruits.  No masses.  Trachea midline.  No cervical lymphadenopathy.  HEART: Regular rate and rhythm, no murmurs.  LUNGS: Clear to auscultation bilaterally.  No Rales, no rhonchi, no wheezing, no stridor.  ABDOMEN: Soft, nondistended,non tender  EXTREMITIES: No tenderness or swelling to the right upper extremity.  There is mild swelling to the left bicipital area without erythema warmth, no tenderness.  No tenderness or swelling to the elbow.  No tenderness or swelling to the forearm wrist or hand.  All compartments are soft of the upper extremity.  Radial and ulnar pulse present and equal bilaterally.  No swelling to the lower extremities., no calf tenderness, dorsal pedal pulses present and equal bilaterally.    ED Course     Labs Reviewed   BASIC METABOLIC PANEL (8) - Abnormal; Notable for the following components:       Result Value    Glucose 109 (*)     Sodium 133 (*)     All other components within normal limits   BASIC METABOLIC PANEL (8) - Abnormal; Notable for the following components:    Glucose 102 (*)     All other components within normal limits   CBC, PLATELET; NO DIFFERENTIAL - Abnormal; Notable for the following components:    WBC 14.2 (*)     MCV 76.6 (*)     MCH 24.9 (*)     All other components within normal limits   CBC W/ DIFFERENTIAL -  Abnormal; Notable for the following components:    WBC 13.9 (*)     MCV 78.1 (*)     MCH 24.8 (*)     Neutrophil Absolute Prelim 9.80 (*)     Neutrophil Absolute 9.80 (*)     All other components within normal limits   PROTHROMBIN TIME (PT) - Normal   PTT, ACTIVATED - Normal       Disposition and Plan     Clinical Impression:  1. Acute embolism and thrombosis of left internal jugular vein (HCC)         Disposition:  Admit  6/19/2024  1:45 pm        Signed by Damion Mejia,  on 6/19/2024  7:52 PM         Us doppler left arm    CONCLUSION:  Age-indeterminate thrombus within the left internal jugular vein.       Cta chest      1. No acute pulmonary embolism.      2. There is an 8 mm hypodense nodule within the right hepatic lobe which is too small to accurately characterize.  This could be further assessed with abdominal ultrasound or liver MRI as clinically appropriate.  Fatty infiltration of the liver is noted.      3. Cardiomegaly.     CT SOFT TISSUE OF NECK   FINDINGS:    PHARYNX/LARYNX: The nasopharynx, oropharynx, hypopharynx, and larynx are unremarkable.  The upper trachea and cervical esophagus are unremarkable.      ORAL CAVITY:  There is no discrete abnormality involving the floor of the mouth or the oral tongue.      GLANDS: The parotid and submandibular glands are unremarkable without a discrete lesion identified.  The thyroid gland is unremarkable.      LYMPH NODES/NECK: No lymph nodes with suspicious morphology or enhancement characteristics are identified. Small lymph nodes are seen scattered throughout the supra-and infrahyoid neck which are likely reactive.  No neck mass is identified.      Scattered atelectasis at the lung apices.      The previously described age indeterminate thrombus within the left internal jugular vein is not well seen on this examination likely secondary to differences in technique.      Partially visualized cardiac electrodes.           History and physical      History of  Present Illness: Patient is a 49 year old male with PMH sig for dilated cardiomyopathy, mr, pacemaker (biv-ICD) who presented with left arm swelling that he has noticed for about the past 4 days.  He was noticing that his left arm and his hand all felt tighter than on his right.  He thought initially it may be related to his sleeping position in which he sleeps on his left arm.  Also about 4 days ago he began to notice some dental pain in his left upper jaw.  He went and saw the dentist the next day and he was started on clindamycin and Peridex topical treatment and instructed to follow-up with periodontist.  He reports having had problems with his dentition in that area before.  He also had noted that his left cheek was red and swollen but since taking the antibiotic that had improved.     Nonetheless his left arm continue to worsen through to today when he noticed that his left arm seemed twice as big as his right arm.  No pain or swelling in the legs or his other arm.  No chest pain or shortness of breath.  No fevers or chills currently.  Although he did have a temperature when he had the dental infection earlier in the week.    ASSESSMENT / PLAN:      49-year-old man with history of dilated cardiomyopathy, MR, BiV ICD who presented with diffuse left arm swelling and found to have left IJ thrombus in the setting of recent dental infection.     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  Had touch base with hematology and have ordered a CTA of the chest and CT of neck.       Recent dental infection  -Continue clinda as previously prescribed for now unless other forthcoming information from CT that is ordered     Dilated cardiomyopathy, MR, BiV ICD  - Resume home medications     Prophy- on hep gtt         MEDICATIONS ADMINISTERED IN LAST 1 DAY:  acetaminophen (Tylenol Extra Strength) tab 500 mg       Date  Action Dose Route User    6/19/2024 2344 Given 500 mg Oral Anne Rosenthal RN          heparin (Porcine) 1000 UNIT/ML injection - BOLUS IV 10,900 Units       Date Action Dose Route User    6/19/2024 1355 Given 10,900 Units Intravenous Negin Johansen RN          carvedilol (Coreg) tab 37.5 mg       Date Action Dose Route User    6/19/2024 1830 Given 37.5 mg Oral Bethany Lopez RN          clindamycin (Cleocin) cap 150 mg       Date Action Dose Route User    6/19/2024 2300 Given 150 mg Oral Anne Rosenthal RN          heparin (Porcine) 95668 units/250mL infusion PE/DVT/THROMBUS CONTINUOUS       Date Action Dose Route User    6/20/2024 0102 New Bag 2,100 Units/hr Intravenous Anne Rosenthal RN    6/20/2024 0030 Hi-Risk Rate/Dose Change 2,100 Units/hr Intravenous Anne Rosenthal RN          heparin (Porcine) 57748 units/250 mL infusion ED (PE/DVT/THROMBUS) INITIAL DOSE       Date Action Dose Route User    6/19/2024 1359 New Bag 18 Units/kg/hr × 136.1 kg Intravenous Negin Johansen RN          enalapril (Vasotec) tab 5 mg       Date Action Dose Route User    6/19/2024 2300 Given 5 mg Oral Anne Rosenthal RN          fluticasone propionate (Flonase) 50 MCG/ACT nasal suspension 2 spray       Date Action Dose Route User    6/19/2024 1830 Given 2 spray Nasal (Bilateral Nares) Bethany Lopez RN          HYDROcodone-acetaminophen (Norco) 5-325 MG per tab 1 tablet       Date Action Dose Route User    6/20/2024 0353 Given 1 tablet Oral Anne Rosenthal RN    6/19/2024 1943 Given 1 tablet Oral Alethea Rogers RN          iopamidol 76% (ISOVUE-370) injection for power injector       Date Action Dose Route User    6/19/2024 2237 Given 100 mL Intravenous Lopez, Samanta A          iopamidol 76% (ISOVUE-370) injection for power injector       Date Action Dose Route User    6/19/2024 2238 Given 50 mL Intravenous Lopez, Samanta A            Vitals (last day)       Date/Time Temp Pulse Resp BP SpO2 Weight O2 Device O2  Flow Rate (L/min) [facility]    06/20/24 0800 98.2 °F (36.8 °C) 81 33 120/68 93 % -- None (Room air) --     06/20/24 0536 97.8 °F (36.6 °C) 83 13 131/69 96 % -- None (Room air) --     06/20/24 0000 98.2 °F (36.8 °C) 76 26 139/82 94 % -- None (Room air) -- AC    06/19/24 2030 98.5 °F (36.9 °C) 100 22 138/77 95 % -- None (Room air) -- AC    06/19/24 1655 -- -- -- -- -- 300 lb -- -- ID    06/19/24 1651 99.5 °F (37.5 °C) 90 23 132/93 96 % -- None (Room air) -- VO    06/19/24 1604 -- 85 18 113/84 95 % -- None (Room air) -- JS    06/19/24 1248 -- 87 18 117/73 95 % -- None (Room air) -- RM    06/19/24 1117 98.2 °F (36.8 °C) 89 16 121/68 97 % 300 lb None (Room air) -- LM

## 2024-06-21 LAB
APTT PPP: 86.6 SECONDS (ref 23–36)
B2 GLYCOPROT1 IGG SERPL IA-ACNC: <0.8 U/ML (ref ?–7)
B2 GLYCOPROT1 IGM SERPL IA-ACNC: 4 U/ML (ref ?–7)
CARDIOLIPIN IGG SERPL-ACNC: 1.9 GPL (ref ?–10)
CARDIOLIPIN IGM SERPL-ACNC: 3.6 MPL (ref ?–10)
INR BLD: 1.13 (ref 0.85–1.16)
LA 3 SCREEN W REFLEX-IMP: POSITIVE
PROTHROMBIN TIME: 14.5 SECONDS (ref 11.6–14.8)
PTT (HEPZYME): 32.8 SECONDS (ref 23–36)
SCREEN DRVVT: 1.11 S (ref 0–1.29)
SCREEN DRVVT: NEGATIVE S
STACLOT LA DELTA: 9.4 SECONDS (ref ?–8)

## 2024-06-21 NOTE — PAYOR COMM NOTE
Discharge Notification    Patient Name: RANJEET  KAREEN RUGGIERO  Payor: [employer]  Subscriber #: [id number]  Authorization Number: [id number]  Admit Date/Time: 6/19/2024 11:13 AM  Discharge Date/Time: 6/20/2024 7:30 PM

## 2024-07-10 ENCOUNTER — TELEPHONE (OUTPATIENT)
Dept: HEMATOLOGY/ONCOLOGY | Facility: HOSPITAL | Age: 49
End: 2024-07-10

## 2024-07-10 NOTE — TELEPHONE ENCOUNTER
Pt called stated he saw Dr. Ramirez while he was admitted in the hospital and he was prescribed blister pack Xarelto 15mg bid and it indicates before day 22 to check in with the doctor because on day 22 pt will begin taking  1 20mg tablet once per day    Pt would like a call back at [phone]    FYI- pt stated Dr. Ramirez advised him to follow up with him in 3months, pt didn't want to schedule follow up at this time

## 2024-08-12 RX ORDER — RIVAROXABAN 20 MG/1
20 TABLET, FILM COATED ORAL
Qty: 30 TABLET | Refills: 0 | Status: SHIPPED | OUTPATIENT
Start: 2024-08-12 | End: 2024-10-17

## 2024-09-07 ENCOUNTER — HOSPITAL ENCOUNTER (OUTPATIENT)
Dept: ULTRASOUND IMAGING | Age: 49
Discharge: HOME | End: 2024-09-07
Attending: INTERNAL MEDICINE
Payer: COMMERCIAL

## 2024-09-07 DIAGNOSIS — I82.C12 ACUTE EMBOLISM AND THROMBOSIS OF LEFT INTERNAL JUGULAR VEIN (HCC): ICD-10-CM

## 2024-09-07 PROCEDURE — 93971 EXTREMITY STUDY: CPT | Performed by: INTERNAL MEDICINE

## 2024-10-17 RX ORDER — RIVAROXABAN 20 MG/1
20 TABLET, FILM COATED ORAL
Qty: 30 TABLET | Refills: 0 | Status: SHIPPED | OUTPATIENT
Start: 2024-10-17

## 2024-11-07 NOTE — PROGRESS NOTES
[facility] Note    Visit Diagnosis:  1. Venous thromboembolism        History of Present Illness: 49M is here to discuss anticoagulation.    -49M with a PMH of Cardiomyopathy and pacemaker presented on 6/19/24 with LUE swelling. On 6/17/24, he met with his dentist for left cheek swelling. No abscess per report. But there was suspicion for a dental infection and he was started on clindamycin with improvement in cheek swelling. He noticed LUE swellling, warmth and tingling around the same time. LUE doppler on 6/19/24 showed an age indeterminate DVT in the left internal jugular vein. CTA chest showed no PE. There was an 8 mm hypodense focus in the right hepatic lobe. US liver with fatty liver and MR liver can be considered. CT neck did not show any suspicious LN or significant clot burden. No personal or FH of VTE. No hormone use. No recent IV placement or trauma. His JAK2/CALR/MPL were negative. His Lupus anticoagulant was positive and repeat testing was recommended. HE was discharged on Xarelto.     -On 9/7/24, he had a follow up LUE US which showed a chronic partial left internal jugular DVT with improved FLOW.    Interval History  -Xarelto is making him feel nauseated but its tolerable. Getting more epistaxis and easy bleeding with minor trauma but tolerable.  -No neck swelling. No arm swelling   -Has some LUE stiffness     Review of Systems: 12 Point ROS was completed and pertinent positives are in the HPI    Medications Ordered Prior to Encounter[1]  Past Medical History:    Allergic rhinitis due to other allergen    Anesthesia complication    Back problem    Bilateral dry eyes    Cardiomyopathy (HCC)    Dilated cardiomyopathy (HCC)    with pacemaker    Failure of implantable cardioverter-defibrillator (ICD) lead    Glaucoma suspect, bilateral    + fh     Mitral regurgitation    Obesity, unspecified     Past Surgical History:   Procedure Laterality Date    Cardiac catherterization (pbp)   09/11/2006    normal arteries    Cardiac defibrillator placement      Cardiac pacemaker placement      Fracture surgery      Laser in situ keratomileusis Bilateral 2005    Other surgical history  07/21/2006    medtronic BiV-ICD    Other surgical history      traumatic amputation of the toe. LT 4th    Other surgical history Right 2015    Rt knee arthroscopy, Dr Gallo     Social History     Socioeconomic History    Marital status: Single   Tobacco Use    Smoking status: Never    Smokeless tobacco: Former     Types: Snuff     Quit date: 1/1/2005   Vaping Use    Vaping status: Never Used   Substance and Sexual Activity    Alcohol use: No    Drug use: No    Sexual activity: Never      Family History   Problem Relation Age of Onset    Heart Disorder Mother         DCM    Alcohol and Other Disorders Associated Father     Glaucoma Father     Other (Other) Father         AAA    Stroke Maternal Grandmother     Cancer Maternal Grandfather         Bone    Heart Disease Paternal Grandmother         CABG    Diabetes Paternal Grandfather     Heart Disease Paternal Grandfather         CABG    Breast Cancer Maternal Aunt     Heart Disease Maternal Uncle         rheumatic fever    Depression Sister         SAD       Physical Exam  Height: --  Weight: 134.3 kg (296 lb) (11/11 1513)  BSA (Calculated - sq m): --  Pulse: 87 (11/11 1513)  BP: 128/85 (11/11 1513)  Temp: 96.9 °F (36.1 °C) (11/11 1513)  Do Not Use - Resp Rate: --  SpO2: 97 % (11/11 1513)    General: NAD, AOX3  HEENT: clear op, mmm, no jvd, no scleral icterus  LN: no supraclavicular, infraclavicular, axillary or inguinal LAD  CV: RRR S1S2 no murmurs  Extremities: No LUE swelling    Lungs: no increased work of breathing  Neuro: CN: II-XII grossly intact      Results:  Lab Results   Component Value Date    WBC 13.6 (H) 06/20/2024    HGB 13.0 06/20/2024    HCT 40.7 06/20/2024    MCV 79.6 (L) 06/20/2024    .0 06/20/2024     Lab Results   Component Value Date      06/20/2024    K 4.0 06/20/2024    CO2 24.0 06/20/2024     06/20/2024    BUN 12 06/20/2024    ALB 4.5 12/04/2021       No results found for: \"LDH\"    Radiology:   LUE Doppler 6/19/24  CONCLUSION:  Age-indeterminate thrombus within the left internal jugular vein.     LUE Doppler 9/7/24  No acute DVT.  Redemonstration partial clot within the left internal jugular vein as present on the prior ultrasound exam flow appears somewhat improved.  No new or worsening abnormality.  Remainder of the deep venous structures appear patent.     Assessment and Plan:  LIJ DVT  -dx 6/19/24. Possibly related to recent left sided dental infection vs. Abscess.   -Lupus anticoagulant positive, possibly a transient positive.   -He was discharged on Xarelto  -3 month follow up shows improvement    8 mm R Hepatic lobe lesion: US abdomen sub-optimal due to hepatic steatosis.         Plan  Will check Protein C/S ATIII, Factor V and [id number] and Repeat APLS panel  Repeat LUE US  If above is negative, we can consider coming off a/c and checking serial D-dimers   Can consider MR Liver  Age appropriate cancer screening with PCP    HE will schedule a telephone visit after above labs and US is complete     Addendum  -Noted to have microcytosis. Added Fe studies  -Noted to have mild leukocytosis which is likely BMI, will add BCR-ABL and CRP    ODILIA Ramirez MD  [facility]         [1]   Current Outpatient Medications on File Prior to Visit   Medication Sig Dispense Refill    XARELTO 20 MG Oral Tab TAKE 1 TABLET(20 MG) BY MOUTH DAILY WITH FOOD 30 tablet 0    Multiple Vitamins-Minerals (MULTI VITAMIN/MINERALS) Oral Tab Take 1 tablet by mouth daily.      Omega 3-6-9 Fatty Acids (OMEGA 3-6-9 COMPLEX OR) Take 1 capsule by mouth daily.      Cholecalciferol (VITAMIN D3) 250 MCG (77689 UT) Oral Cap Take 1 tablet by mouth daily.      cycloSPORINE (RESTASIS) 0.05 % Ophthalmic Emulsion Place 1 drop into both eyes 2 (two) times  daily. 60 mL 6    Enalapril Maleate 5 MG Oral Tab Take 1 tablet (5 mg total) by mouth 2 (two) times daily.      carvedilol 25 MG Oral Tab Take 1 1/2 tablets BID      ALLEGRA 180 MG OR TABS 1 TABLET DAILY      ASPIRIN 325 MG OR TABS Take 81 mg by mouth.       No current facility-administered medications on file prior to visit.

## 2024-11-11 ENCOUNTER — OFFICE VISIT (OUTPATIENT)
Dept: HEMATOLOGY/ONCOLOGY | Age: 49
End: 2024-11-11
Attending: INTERNAL MEDICINE
Payer: COMMERCIAL

## 2024-11-11 VITALS
OXYGEN SATURATION: 97 % | TEMPERATURE: 97 F | BODY MASS INDEX: 42 KG/M2 | RESPIRATION RATE: 18 BRPM | SYSTOLIC BLOOD PRESSURE: 128 MMHG | HEART RATE: 87 BPM | DIASTOLIC BLOOD PRESSURE: 85 MMHG | WEIGHT: 296 LBS

## 2024-11-11 DIAGNOSIS — I82.90 VENOUS THROMBOEMBOLISM: Primary | ICD-10-CM

## 2024-11-11 LAB
ALBUMIN SERPL-MCNC: 3.4 G/DL (ref 3.4–5)
ALBUMIN/GLOB SERPL: 0.8 {RATIO} (ref 1–2)
ALP LIVER SERPL-CCNC: 75 U/L
ALT SERPL-CCNC: 33 U/L
ANION GAP SERPL CALC-SCNC: 4 MMOL/L (ref 0–18)
AST SERPL-CCNC: 10 U/L (ref 15–37)
BASOPHILS # BLD AUTO: 0.09 X10(3) UL (ref 0–0.2)
BASOPHILS NFR BLD AUTO: 0.7 %
BILIRUB SERPL-MCNC: 0.3 MG/DL (ref 0.1–2)
BUN BLD-MCNC: 13 MG/DL (ref 9–23)
CALCIUM BLD-MCNC: 9 MG/DL (ref 8.5–10.1)
CHLORIDE SERPL-SCNC: 105 MMOL/L (ref 98–112)
CO2 SERPL-SCNC: 26 MMOL/L (ref 21–32)
CREAT BLD-MCNC: 0.73 MG/DL
CRP SERPL-MCNC: 2.22 MG/DL (ref ?–0.3)
D DIMER PPP FEU-MCNC: <0.27 UG/ML FEU (ref ?–0.5)
DEPRECATED HBV CORE AB SER IA-ACNC: 47 NG/ML
EGFRCR SERPLBLD CKD-EPI 2021: 113 ML/MIN/1.73M2 (ref 60–?)
EOSINOPHIL # BLD AUTO: 0.41 X10(3) UL (ref 0–0.7)
EOSINOPHIL NFR BLD AUTO: 3 %
ERYTHROCYTE [DISTWIDTH] IN BLOOD BY AUTOMATED COUNT: 15 %
GLOBULIN PLAS-MCNC: 4.2 G/DL (ref 2.8–4.4)
GLUCOSE BLD-MCNC: 100 MG/DL (ref 70–99)
HCT VFR BLD AUTO: 42.8 %
HGB BLD-MCNC: 13.9 G/DL
IMM GRANULOCYTES # BLD AUTO: 0.09 X10(3) UL (ref 0–1)
IMM GRANULOCYTES NFR BLD: 0.7 %
LYMPHOCYTES # BLD AUTO: 3.18 X10(3) UL (ref 1–4)
LYMPHOCYTES NFR BLD AUTO: 23.3 %
MCH RBC QN AUTO: 25.4 PG (ref 26–34)
MCHC RBC AUTO-ENTMCNC: 32.5 G/DL (ref 31–37)
MCV RBC AUTO: 78.2 FL
MONOCYTES # BLD AUTO: 1 X10(3) UL (ref 0.1–1)
MONOCYTES NFR BLD AUTO: 7.3 %
NEUTROPHILS # BLD AUTO: 8.87 X10 (3) UL (ref 1.5–7.7)
NEUTROPHILS # BLD AUTO: 8.87 X10(3) UL (ref 1.5–7.7)
NEUTROPHILS NFR BLD AUTO: 65 %
OSMOLALITY SERPL CALC.SUM OF ELEC: 280 MOSM/KG (ref 275–295)
PLATELET # BLD AUTO: 413 10(3)UL (ref 150–450)
POTASSIUM SERPL-SCNC: 4 MMOL/L (ref 3.5–5.1)
PROT SERPL-MCNC: 7.6 G/DL (ref 6.4–8.2)
RBC # BLD AUTO: 5.47 X10(6)UL
SODIUM SERPL-SCNC: 135 MMOL/L (ref 136–145)
WBC # BLD AUTO: 13.6 X10(3) UL (ref 4–11)

## 2024-11-11 PROCEDURE — 99214 OFFICE O/P EST MOD 30 MIN: CPT | Performed by: INTERNAL MEDICINE

## 2024-11-11 NOTE — ADDENDUM NOTE
Addended by: MARYSE HERRERA on: 11/11/2024 03:48 PM     Modules accepted: Orders, Level of Service

## 2024-11-11 NOTE — PROGRESS NOTES
Patient here for follow-up. Had US performed in September. Continues on Xarelto 20 mg. States it causes him to have longer nose bleeds and easier cuts/prolonged bleeding with yard work.

## 2024-11-12 LAB
APTT PPP: 39 SECONDS (ref 23–36)
F2 C.20210G>A GENO BLD/T: NORMAL
F5 P.R506Q BLD/T QL: NORMAL
INR BLD: 1.59 (ref 0.85–1.16)
IRON SATN MFR SERPL: 10 %
IRON SERPL-MCNC: 37 UG/DL
LA 3 SCREEN W REFLEX-IMP: NEGATIVE
PROTHROMBIN TIME: 19.1 SECONDS (ref 11.6–14.8)
SCREEN DRVVT: 1.87 S (ref 0–1.29)
SCREEN DRVVT: POSITIVE S
STACLOT LA DELTA: 7 SECONDS (ref ?–8)
TOTAL IRON BINDING CAPACITY: 356 UG/DL (ref 250–425)
TRANSFERRIN SERPL-MCNC: 272 MG/DL (ref 215–365)

## 2024-11-13 LAB
ANTITHROMBIN: 102 %
B2 GLYCOPROT1 IGG SERPL IA-ACNC: <0.8 U/ML (ref ?–7)
B2 GLYCOPROT1 IGM SERPL IA-ACNC: 3.4 U/ML (ref ?–7)
CARDIOLIPIN IGG SERPL-ACNC: 2.4 GPL (ref ?–10)
CARDIOLIPIN IGM SERPL-ACNC: 3.5 MPL (ref ?–10)
PROTEIN C FUNCTIONAL: 107 %
PROTEIN S FUNCTION: 113 %

## 2024-11-16 ENCOUNTER — HOSPITAL ENCOUNTER (OUTPATIENT)
Dept: ULTRASOUND IMAGING | Age: 49
Discharge: HOME | End: 2024-11-16
Attending: INTERNAL MEDICINE
Payer: COMMERCIAL

## 2024-11-16 DIAGNOSIS — I82.90 VENOUS THROMBOEMBOLISM: ICD-10-CM

## 2024-11-16 PROCEDURE — 93971 EXTREMITY STUDY: CPT | Performed by: INTERNAL MEDICINE

## 2024-11-18 DIAGNOSIS — I82.90 VENOUS THROMBOEMBOLISM: Primary | ICD-10-CM

## 2025-02-07 ENCOUNTER — HOSPITAL ENCOUNTER (EMERGENCY)
Age: 50
Discharge: HOME | End: 2025-02-07
Attending: EMERGENCY MEDICINE
Payer: COMMERCIAL

## 2025-02-07 VITALS
OXYGEN SATURATION: 97 % | DIASTOLIC BLOOD PRESSURE: 102 MMHG | WEIGHT: 295.44 LBS | TEMPERATURE: 98 F | RESPIRATION RATE: 16 BRPM | HEART RATE: 90 BPM | BODY MASS INDEX: 42 KG/M2 | SYSTOLIC BLOOD PRESSURE: 153 MMHG

## 2025-02-07 DIAGNOSIS — S61.011A LACERATION OF RIGHT THUMB WITHOUT FOREIGN BODY WITHOUT DAMAGE TO NAIL, INITIAL ENCOUNTER: Primary | ICD-10-CM

## 2025-02-07 PROCEDURE — 90471 IMMUNIZATION ADMIN: CPT

## 2025-02-07 PROCEDURE — 99283 EMERGENCY DEPT VISIT LOW MDM: CPT

## 2025-02-07 NOTE — DISCHARGE INSTRUCTIONS
Keep wound clean and dry.   Leave the pressure dressing on for the next 24 hours.   After this wash with soap and water twice a day.   Apply triple antibiotic ointment twice a day for the 3 days.   Then leave open to air as the oxygen will help it to heal.   Take Tylenol and/or ibuprofen as needed for pain.   Watch for any increased redness, swelling, or drainage.   Follow up with your PCP in 3-5 days.     Thank you for choosing [facility] for your care.

## 2025-02-07 NOTE — ED PROVIDER NOTES
Patient Seen in: [facility] In [place]      History     Chief Complaint   Patient presents with    Laceration/Abrasion     Stated Complaint: thumb lac- on blood thinner    Subjective:   50 yo male presents to the emergency department with c/o laceration.  Patient states he accidentally caught his right thumb with his pocket knife when he was closing it up.  The laceration is small, but patient is on Xarelto daily and couldn't get the blood to stop.  He believes his last tetanus shot was about 5 years ago.  He denies any other injuries.      The history is provided by the patient.         Objective:     Past Medical History:    Allergic rhinitis due to other allergen    Anesthesia complication    Back problem    Bilateral dry eyes    Cardiomyopathy (HCC)    Dilated cardiomyopathy (HCC)    with pacemaker    Failure of implantable cardioverter-defibrillator (ICD) lead    Glaucoma suspect, bilateral    + fh     Mitral regurgitation    Obesity, unspecified              Past Surgical History:   Procedure Laterality Date    Cardiac catherterization (pbp)  09/11/2006    normal arteries    Cardiac defibrillator placement      Cardiac pacemaker placement      Fracture surgery      Laser in situ keratomileusis Bilateral 2005    Other surgical history  07/21/2006    medtronic BiV-ICD    Other surgical history      traumatic amputation of the toe. LT 4th    Other surgical history Right 2015    Rt knee arthroscopy, Dr Gallo                Social History     Socioeconomic History    Marital status: Single   Tobacco Use    Smoking status: Never    Smokeless tobacco: Former     Types: Snuff     Quit date: 1/1/2005   Vaping Use    Vaping status: Never Used   Substance and Sexual Activity    Alcohol use: No    Drug use: No    Sexual activity: Never     Social Drivers of Health     Food Insecurity: No Food Insecurity (6/19/2024)    Food Insecurity     Food Insecurity: Never true   Transportation Needs: No  Transportation Needs (6/19/2024)    Transportation Needs     Lack of Transportation: No   Housing Stability: Low Risk  (6/19/2024)    Housing Stability     Housing Instability: No                  Physical Exam     ED Triage Vitals [02/07/25 1225]   /65   Pulse 92   Resp 16   Temp 97.9 °F (36.6 °C)   Temp src Temporal   SpO2 97 %   O2 Device None (Room air)       Current Vitals:   Vital Signs  BP: (!) 153/102  Pulse: 90  Resp: 16  Temp: 97.9 °F (36.6 °C)  Temp src: Temporal    Oxygen Therapy  SpO2: 97 %  O2 Device: None (Room air)        Physical Exam  Vitals and nursing note reviewed.   Constitutional:       General: He is not in acute distress.     Appearance: Normal appearance. He is obese. He is not ill-appearing.   HENT:      Head: Normocephalic and atraumatic.      Mouth/Throat:      Mouth: Mucous membranes are moist.      Pharynx: Oropharynx is clear.   Eyes:      Conjunctiva/sclera: Conjunctivae normal.   Cardiovascular:      Rate and Rhythm: Normal rate and regular rhythm.      Pulses: Normal pulses.      Heart sounds: Normal heart sounds.   Pulmonary:      Effort: Pulmonary effort is normal. No respiratory distress.      Breath sounds: Normal breath sounds.   Musculoskeletal:         General: Normal range of motion.   Skin:     General: Skin is warm and dry.      Comments: 0.5 cm linear laceration to the skin of the dorsal right first IP joint.  Glove and band-aid were removed.  No active bleeding at this time.     Neurological:      General: No focal deficit present.      Mental Status: He is alert and oriented to person, place, and time.   Psychiatric:         Mood and Affect: Mood normal.         Behavior: Behavior normal.           ED Course   Labs Reviewed - No data to display       MDM        Medical Decision Making  48 yo male with laceration of the right thumb.  Immunization record was was reviewed, and patient's last tetanus shot was received in 2016.  Will update patient's tetanus shot  today.  LET and Asepsis ordered for patient.    Wound reassessed after 20 minutes with the topical lidocaine and epinephrine.  Good vasoconstriction was achieved.  Wound cleansed and pressure dressing applied.  Do not feel that any repair is necessary at this time.  Encourage patient to leave the dressing on for the next 24 hours, and then can be removed.  No evidence of sepsis or cellulitis.  Patient follow-up with his PCP return as needed.    Risk  OTC drugs.        Disposition and Plan     Clinical Impression:  1. Laceration of right thumb without foreign body without damage to nail, initial encounter         Disposition:  Discharge  2/7/2025  1:10 pm    Follow-up:  Ld Tai MD  [address] [place] [address]  [phone]    Follow up in 3 day(s)  As needed          Medications Prescribed:  Discharge Medication List as of 2/7/2025  1:14 PM              Supplementary Documentation:

## 2025-02-07 NOTE — ED INITIAL ASSESSMENT (HPI)
States his pocketknife closed on his right thumb an hr ago causing a lac. Pt on Xarelto   The skin at the left upper chest was sutured closed.

## 2025-02-22 ENCOUNTER — HOSPITAL ENCOUNTER (OUTPATIENT)
Dept: ULTRASOUND IMAGING | Age: 50
Discharge: HOME | End: 2025-02-22
Attending: INTERNAL MEDICINE
Payer: COMMERCIAL

## 2025-02-22 DIAGNOSIS — I82.90 VENOUS THROMBOEMBOLISM: ICD-10-CM

## 2025-02-22 PROCEDURE — 93971 EXTREMITY STUDY: CPT | Performed by: INTERNAL MEDICINE

## 2025-03-20 ENCOUNTER — TELEPHONE (OUTPATIENT)
Age: 50
End: 2025-03-20

## 2025-03-20 NOTE — PROGRESS NOTES
[facility] Note    Visit Diagnosis:  No diagnosis found.      History of Present Illness: 49M is here to discuss anticoagulation.    -49M with a PMH of Cardiomyopathy and pacemaker presented on 6/19/24 with LUE swelling. On 6/17/24, he met with his dentist for left cheek swelling. No abscess per report. But there was suspicion for a dental infection and he was started on clindamycin with improvement in cheek swelling. He noticed LUE swellling, warmth and tingling around the same time. LUE doppler on 6/19/24 showed an age indeterminate DVT in the left internal jugular vein. CTA chest showed no PE. There was an 8 mm hypodense focus in the right hepatic lobe. US liver with fatty liver and MR liver can be considered. CT neck did not show any suspicious LN or significant clot burden. No personal or FH of VTE. No hormone use. No recent IV placement or trauma. His JAK2/CALR/MPL were negative. His Lupus anticoagulant was positive and repeat testing was recommended. HE was discharged on Xarelto.     -On 9/7/24, he had a follow up LUE US which showed a chronic partial left internal jugular DVT with improved FLOW.    -02/22/25: LUE Doppler with no DVT    Interval History  -No major issues with Xarelto. Occasional epistaxis. Has mild muscle aches in associated with Xarelto   -He held Xarelto for a couple days for today's APLS panel  -HE is taking PO iron, Vit C. He is tolerating this.   -Doppler reviewed  -EGD/Colonoscopy scheduled with Dr. De La Rosa on 4/25/25  -MR Abdomen    Review of Systems: 12 Point ROS was completed and pertinent positives are in the HPI    Medications Ordered Prior to Encounter[1]  Past Medical History:    Allergic rhinitis due to other allergen    Anesthesia complication    Back problem    Bilateral dry eyes    Cardiomyopathy (HCC)    Dilated cardiomyopathy (HCC)    with pacemaker    Failure of implantable cardioverter-defibrillator (ICD) lead    Glaucoma suspect, bilateral    +  fh     Mitral regurgitation    Obesity, unspecified     Past Surgical History:   Procedure Laterality Date    Cardiac catherterization (pbp)  09/11/2006    normal arteries    Cardiac defibrillator placement      Cardiac pacemaker placement      Fracture surgery      Laser in situ keratomileusis Bilateral 2005    Other surgical history  07/21/2006    medtronic BiV-ICD    Other surgical history      traumatic amputation of the toe. LT 4th    Other surgical history Right 2015    Rt knee arthroscopy, Dr Gallo     Social History     Socioeconomic History    Marital status: Single   Tobacco Use    Smoking status: Never    Smokeless tobacco: Former     Types: Snuff     Quit date: 1/1/2005   Vaping Use    Vaping status: Never Used   Substance and Sexual Activity    Alcohol use: No    Drug use: No    Sexual activity: Never      Family History   Problem Relation Age of Onset    Heart Disorder Mother         DCM    Alcohol and Other Disorders Associated Father     Glaucoma Father     Other (Other) Father         AAA    Stroke Maternal Grandmother     Cancer Maternal Grandfather         Bone    Heart Disease Paternal Grandmother         CABG    Diabetes Paternal Grandfather     Heart Disease Paternal Grandfather         CABG    Breast Cancer Maternal Aunt     Heart Disease Maternal Uncle         rheumatic fever    Depression Sister         SAD       Physical Exam  Height: --  Weight: --  BSA (Calculated - sq m): --  Pulse: --  BP: --  Temp: --  Do Not Use - Resp Rate: --  SpO2: --    General: NAD, AOX3  HEENT: clear op, mmm, no jvd, no scleral icterus  LN: no supraclavicular, infraclavicular, axillary or inguinal LAD  CV: RRR S1S2 no murmurs  Extremities: No LUE swelling    Lungs: no increased work of breathing  Neuro: CN: II-XII grossly intact      Results:  Lab Results   Component Value Date    WBC 13.6 (H) 11/11/2024    HGB 13.9 11/11/2024    HCT 42.8 11/11/2024    MCV 78.2 (L) 11/11/2024    .0 11/11/2024     Lab  Results   Component Value Date     (L) 11/11/2024    K 4.0 11/11/2024    CO2 26.0 11/11/2024     11/11/2024    BUN 13 11/11/2024    ALB 3.4 11/11/2024       No results found for: \"LDH\"    Radiology:   LUE Doppler 6/19/24  CONCLUSION:  Age-indeterminate thrombus within the left internal jugular vein.     LUE Doppler 9/7/24  No acute DVT.  Redemonstration partial clot within the left internal jugular vein as present on the prior ultrasound exam flow appears somewhat improved.  No new or worsening abnormality.  Remainder of the deep venous structures appear patent.     Assessment and Plan:  LIJ DVT  -dx 6/19/24. Possibly related to recent left sided dental infection vs. Abscess.   -Lupus anticoagulant positive, possibly a transient positive.   -normal Protein C/S ATIII, Factor V and [id number]  -Negative JAK2, CALR, MPL  -He was discharged on Xarelto  -Doppler from 02/2025 shows resolution     8 mm R Hepatic lobe lesion: US abdomen sub-optimal due to hepatic steatosis.     Mild Iron Deficiency: persistent. On PO iron. GI work up in process     We will complete 1 year of anticoagulation in June 2025. At this point we will check serial D-dimers to see if we can come off of anticoagulation.      Plan  Repeat APLS panel today (he was holding Xarelto)  Hold Xarelto 3 days prior to colonoscopy   RTC in June 2025-we will check serial d-dimers  Continue oral iron + Vit C  Colonoscopy and EGD planned   Repeat CBC, FE studies today  MR Liver ordered        M. Jayjay Ramirez MD  [facility]         [1]   Current Outpatient Medications on File Prior to Visit   Medication Sig Dispense Refill    XARELTO 20 MG Oral Tab TAKE 1 TABLET(20 MG) BY MOUTH DAILY WITH FOOD 30 tablet 0    Multiple Vitamins-Minerals (MULTI VITAMIN/MINERALS) Oral Tab Take 1 tablet by mouth daily.      Omega 3-6-9 Fatty Acids (OMEGA 3-6-9 COMPLEX OR) Take 1 capsule by mouth daily.      Cholecalciferol (VITAMIN D3) 250 MCG (85871  UT) Oral Cap Take 1 tablet by mouth daily.      cycloSPORINE (RESTASIS) 0.05 % Ophthalmic Emulsion Place 1 drop into both eyes 2 (two) times daily. 60 mL 6    Enalapril Maleate 5 MG Oral Tab Take 1 tablet (5 mg total) by mouth 2 (two) times daily.      carvedilol 25 MG Oral Tab Take 1 1/2 tablets BID      ALLEGRA 180 MG OR TABS 1 TABLET DAILY      ASPIRIN 325 MG OR TABS Take 81 mg by mouth.       No current facility-administered medications on file prior to visit.

## 2025-03-20 NOTE — TELEPHONE ENCOUNTER
Spoke with Chioma. She will refax form. Will complete after seeing patient this upcoming Monday.

## 2025-03-20 NOTE — TELEPHONE ENCOUNTER
Robert Wick [facility]oma at [phone] is calliing because she faxed a medication clearance on 3/6/25 and they never received a fax back. The procedure for this patient is on 4/25/25. The fax # [phone]

## 2025-03-24 ENCOUNTER — OFFICE VISIT (OUTPATIENT)
Age: 50
End: 2025-03-24
Attending: INTERNAL MEDICINE
Payer: COMMERCIAL

## 2025-03-24 VITALS
HEART RATE: 90 BPM | RESPIRATION RATE: 18 BRPM | TEMPERATURE: 98 F | SYSTOLIC BLOOD PRESSURE: 139 MMHG | BODY MASS INDEX: 44 KG/M2 | DIASTOLIC BLOOD PRESSURE: 83 MMHG | OXYGEN SATURATION: 94 % | WEIGHT: 309.63 LBS

## 2025-03-24 DIAGNOSIS — R93.89 ABNORMAL ULTRASOUND: ICD-10-CM

## 2025-03-24 DIAGNOSIS — E61.1 IRON DEFICIENCY: ICD-10-CM

## 2025-03-24 DIAGNOSIS — I82.90 VENOUS THROMBOEMBOLISM: Primary | ICD-10-CM

## 2025-03-24 LAB
ALBUMIN SERPL-MCNC: 4.4 G/DL (ref 3.2–4.8)
ALBUMIN/GLOB SERPL: 1.6 {RATIO} (ref 1–2)
ALP LIVER SERPL-CCNC: 67 U/L
ALT SERPL-CCNC: 23 U/L
ANION GAP SERPL CALC-SCNC: 7 MMOL/L (ref 0–18)
AST SERPL-CCNC: 13 U/L (ref ?–34)
BASOPHILS # BLD AUTO: 0.09 X10(3) UL (ref 0–0.2)
BASOPHILS NFR BLD AUTO: 0.8 %
BILIRUB SERPL-MCNC: 0.4 MG/DL (ref 0.3–1.2)
BUN BLD-MCNC: 13 MG/DL (ref 9–23)
CALCIUM BLD-MCNC: 9.5 MG/DL (ref 8.7–10.6)
CHLORIDE SERPL-SCNC: 105 MMOL/L (ref 98–112)
CO2 SERPL-SCNC: 25 MMOL/L (ref 21–32)
CREAT BLD-MCNC: 0.78 MG/DL
DEPRECATED HBV CORE AB SER IA-ACNC: 59 NG/ML
EGFRCR SERPLBLD CKD-EPI 2021: 111 ML/MIN/1.73M2 (ref 60–?)
EOSINOPHIL # BLD AUTO: 0.43 X10(3) UL (ref 0–0.7)
EOSINOPHIL NFR BLD AUTO: 3.8 %
ERYTHROCYTE [DISTWIDTH] IN BLOOD BY AUTOMATED COUNT: 15.3 %
FASTING STATUS PATIENT QL REPORTED: NO
GLOBULIN PLAS-MCNC: 2.8 G/DL (ref 2–3.5)
GLUCOSE BLD-MCNC: 134 MG/DL (ref 70–99)
HCT VFR BLD AUTO: 43 %
HGB BLD-MCNC: 14 G/DL
IMM GRANULOCYTES # BLD AUTO: 0.07 X10(3) UL (ref 0–1)
IMM GRANULOCYTES NFR BLD: 0.6 %
IRON SATN MFR SERPL: 17 %
IRON SERPL-MCNC: 58 UG/DL
LYMPHOCYTES # BLD AUTO: 2.8 X10(3) UL (ref 1–4)
LYMPHOCYTES NFR BLD AUTO: 24.5 %
MCH RBC QN AUTO: 26 PG (ref 26–34)
MCHC RBC AUTO-ENTMCNC: 32.6 G/DL (ref 31–37)
MCV RBC AUTO: 79.8 FL
MONOCYTES # BLD AUTO: 0.77 X10(3) UL (ref 0.1–1)
MONOCYTES NFR BLD AUTO: 6.7 %
NEUTROPHILS # BLD AUTO: 7.26 X10 (3) UL (ref 1.5–7.7)
NEUTROPHILS # BLD AUTO: 7.26 X10(3) UL (ref 1.5–7.7)
NEUTROPHILS NFR BLD AUTO: 63.6 %
OSMOLALITY SERPL CALC.SUM OF ELEC: 286 MOSM/KG (ref 275–295)
PLATELET # BLD AUTO: 375 10(3)UL (ref 150–450)
POTASSIUM SERPL-SCNC: 4.2 MMOL/L (ref 3.5–5.1)
PROT SERPL-MCNC: 7.2 G/DL (ref 5.7–8.2)
RBC # BLD AUTO: 5.39 X10(6)UL
SODIUM SERPL-SCNC: 137 MMOL/L (ref 136–145)
TOTAL IRON BINDING CAPACITY: 337 UG/DL (ref 250–425)
TRANSFERRIN SERPL-MCNC: 263 MG/DL (ref 215–365)
WBC # BLD AUTO: 11.4 X10(3) UL (ref 4–11)

## 2025-03-24 RX ORDER — MULTIVITAMIN WITH IRON
250 TABLET ORAL
COMMUNITY

## 2025-03-24 RX ORDER — ASPIRIN 81 MG/1
81 TABLET, CHEWABLE ORAL
COMMUNITY
Start: 2025-03-13

## 2025-03-24 RX ORDER — ASCORBIC ACID 500 MG
500 TABLET ORAL DAILY
COMMUNITY

## 2025-03-24 NOTE — PROGRESS NOTES
Patient here for follow-up. Taking Xarelto 20 mg daily. Has some left ear fullness and left shoulder pain. Has prolonged bleeding issues while on Xarelto. Having scopes down here in the next couple weeks through [facility].

## 2025-03-25 LAB
APTT PPP: 26.6 SECONDS (ref 23–36)
B2 GLYCOPROT1 IGG SERPL IA-ACNC: <0.8 U/ML (ref ?–7)
B2 GLYCOPROT1 IGM SERPL IA-ACNC: 2.7 U/ML (ref ?–7)
CARDIOLIPIN IGG SERPL-ACNC: 2 GPL (ref ?–10)
CARDIOLIPIN IGM SERPL-ACNC: 3.2 MPL (ref ?–10)
LA 3 SCREEN W REFLEX-IMP: NEGATIVE
PROTHROMBIN TIME: 13.1 SECONDS (ref 11.6–14.8)
SCREEN DRVVT: 1.09 S (ref 0–1.29)
SCREEN DRVVT: NEGATIVE S

## 2025-04-09 ENCOUNTER — APPOINTMENT (OUTPATIENT)
Dept: ADMINISTRATIVE | Facility: HOSPITAL | Age: 50
End: 2025-04-09
Payer: COMMERCIAL

## 2025-04-09 RX ORDER — HYDROCODONE BITARTRATE AND ACETAMINOPHEN 5; 325 MG/1; MG/1
1-2 TABLET ORAL EVERY 6 HOURS PRN
COMMUNITY
Start: 2025-03-18

## 2025-04-24 ENCOUNTER — ANESTHESIA EVENT (OUTPATIENT)
Dept: ENDOSCOPY | Facility: HOSPITAL | Age: 50
End: 2025-04-24
Payer: COMMERCIAL

## 2025-04-25 ENCOUNTER — HOSPITAL ENCOUNTER (OUTPATIENT)
Facility: HOSPITAL | Age: 50
Setting detail: HOSPITAL OUTPATIENT SURGERY
Discharge: HOME | End: 2025-04-25
Attending: INTERNAL MEDICINE | Admitting: INTERNAL MEDICINE
Payer: COMMERCIAL

## 2025-04-25 ENCOUNTER — ANESTHESIA (OUTPATIENT)
Dept: ENDOSCOPY | Facility: HOSPITAL | Age: 50
End: 2025-04-25
Payer: COMMERCIAL

## 2025-04-25 VITALS
OXYGEN SATURATION: 95 % | HEIGHT: 70 IN | SYSTOLIC BLOOD PRESSURE: 119 MMHG | DIASTOLIC BLOOD PRESSURE: 71 MMHG | TEMPERATURE: 98 F | BODY MASS INDEX: 43.67 KG/M2 | WEIGHT: 305 LBS | HEART RATE: 94 BPM | RESPIRATION RATE: 18 BRPM

## 2025-04-25 PROCEDURE — 88305 TISSUE EXAM BY PATHOLOGIST: CPT | Performed by: INTERNAL MEDICINE

## 2025-04-25 RX ORDER — SODIUM CHLORIDE, SODIUM LACTATE, POTASSIUM CHLORIDE, CALCIUM CHLORIDE 600; 310; 30; 20 MG/100ML; MG/100ML; MG/100ML; MG/100ML
INJECTION, SOLUTION INTRAVENOUS CONTINUOUS
Status: DISCONTINUED | OUTPATIENT
Start: 2025-04-25 | End: 2025-04-25

## 2025-04-25 RX ORDER — LIDOCAINE HYDROCHLORIDE 10 MG/ML
INJECTION, SOLUTION EPIDURAL; INFILTRATION; INTRACAUDAL; PERINEURAL AS NEEDED
Status: DISCONTINUED | OUTPATIENT
Start: 2025-04-25 | End: 2025-04-25 | Stop reason: SURG

## 2025-04-25 RX ADMIN — SODIUM CHLORIDE, SODIUM LACTATE, POTASSIUM CHLORIDE, CALCIUM CHLORIDE: 600; 310; 30; 20 INJECTION, SOLUTION INTRAVENOUS at 13:19:00

## 2025-04-25 RX ADMIN — LIDOCAINE HYDROCHLORIDE 50 MG: 10 INJECTION, SOLUTION EPIDURAL; INFILTRATION; INTRACAUDAL; PERINEURAL at 13:22:00

## 2025-04-25 NOTE — ANESTHESIA PREPROCEDURE EVALUATION
PRE-OP EVALUATION    Patient Name: Chuck Kirby Jr.    Admit Diagnosis: IRON DEFICIECY ANESMIA, UNSPECIFIED IRON DEFICIENCY ANEMIA TYPE    Pre-op Diagnosis: IRON DEFICIECY ANESMIA, UNSPECIFIED IRON DEFICIENCY ANEMIA TYPE    ESOPHAGOGASTRODUODENOSCOPY (EGD) with biopsies , COLONOSCOPY    Anesthesia Procedure: ESOPHAGOGASTRODUODENOSCOPY (EGD) with biopsies , COLONOSCOPY  COLONOSCOPY    Surgeons and Role:     * Campos De La Rosa MD - Primary    Pre-op vitals reviewed.  Temp: 98.3 °F (36.8 °C)  Pulse: 96  Resp: 16  BP: 145/53  SpO2: 95 %  Body mass index is 43.76 kg/m².    Current medications reviewed.  Hospital Medications:  Current Medications[1]    Outpatient Medications:   Prescriptions Prior to Admission[2]    Allergies: Ampicillin, Nsaids, and Perflutren [propane]      Anesthesia Evaluation    Patient summary reviewed.    Anesthetic Complications  (-) history of anesthetic complications         GI/Hepatic/Renal    Negative GI/hepatic/renal ROS.                             Cardiovascular    Negative cardiovascular ROS.    Exercise tolerance: good     MET: >4    (+) obesity  (+) hypertension           (+) pacemaker/AICD          (+) CHF                Endo/Other    Negative endo/other ROS.                              Pulmonary    Negative pulmonary ROS.                       Neuro/Psych    Negative neuro/psych ROS.                                  Past Surgical History[3]  Social Hx on file[4]  History   Drug Use No     Available pre-op labs reviewed.  Lab Results   Component Value Date    WBC 11.4 (H) 03/24/2025    RBC 5.39 03/24/2025    HGB 14.0 03/24/2025    HCT 43.0 03/24/2025    MCV 79.8 (L) 03/24/2025    MCH 26.0 03/24/2025    MCHC 32.6 03/24/2025    RDW 15.3 03/24/2025    .0 03/24/2025     Lab Results   Component Value Date     03/24/2025    K 4.2 03/24/2025     03/24/2025    CO2 25.0 03/24/2025    BUN 13 03/24/2025    CREATSERUM 0.78 03/24/2025     (H) 03/24/2025    CA 9.5 03/24/2025             Airway      Mallampati: III  Mouth opening: >3 FB  TM distance: > 6 cm  Neck ROM: full Cardiovascular    Cardiovascular exam normal.  Rhythm: regular  Rate: normal     Dental    Dentition appears grossly intact         Pulmonary    Pulmonary exam normal.  Breath sounds clear to auscultation bilaterally.               Other findings              ASA: 3   Plan: MAC  NPO status verified and patient meets guidelines.  Patient has not taken beta blockers in last 24 hours.  Post-procedure pain management plan discussed with surgeon and patient.      Plan/risks discussed with: patient and mother                Present on Admission:  **None**             [1]    lactated ringers infusion   Intravenous Continuous   [2]   Medications Prior to Admission   Medication Sig Dispense Refill Last Dose/Taking    HYDROcodone-acetaminophen 5-325 MG Oral Tab Take 1-2 tablets by mouth every 6 (six) hours as needed for Pain.   4/24/2025    Vitamin C 500 MG Oral Tab Take 1 tablet (500 mg total) by mouth in the morning.   Past Week    Iron, Ferrous Sulfate, 325 (65 Fe) MG Oral Tab    Past Month    aspirin 81 MG Oral Chew Tab Chew 1 tablet (81 mg total) by mouth daily with food.   4/25/2025    XARELTO 20 MG Oral Tab TAKE 1 TABLET(20 MG) BY MOUTH DAILY WITH FOOD 30 tablet 0 4/22/2025    Multiple Vitamins-Minerals (MULTI VITAMIN/MINERALS) Oral Tab Take 1 tablet by mouth in the morning.   Past Week    Omega 3-6-9 Fatty Acids (OMEGA 3-6-9 COMPLEX OR) Take 1 capsule by mouth in the morning.   4/24/2025    Cholecalciferol (VITAMIN D3) 250 MCG (85429 UT) Oral Cap Take 1 tablet by mouth in the morning.   Past Week    Enalapril Maleate 5 MG Oral Tab Take 1 tablet (5 mg total) by mouth in the morning and 1 tablet (5 mg total) before bedtime.   4/25/2025    carvedilol 25 MG Oral Tab Take 1 1/2 tablets BID   4/25/2025    ALLEGRA 180 MG OR TABS Take by mouth.   4/24/2025    cycloSPORINE (RESTASIS) 0.05 % Ophthalmic Emulsion Place 1 drop into  both eyes 2 (two) times daily. 60 mL 6 Unknown   [3]   Past Surgical History:  Procedure Laterality Date    Cardiac catherterization (pbp)  09/11/2006    normal arteries    Cardiac defibrillator placement      Cardiac pacemaker placement      Fracture surgery      Laser in situ keratomileusis Bilateral 2005    Other surgical history  07/21/2006    medtronic BiV-ICD    Other surgical history      traumatic amputation of the toe. LT 4th    Other surgical history Right 2015    Rt knee arthroscopy, Dr Gallo   [4]   Social History  Socioeconomic History    Marital status: Single   Tobacco Use    Smoking status: Never    Smokeless tobacco: Former     Types: Snuff     Quit date: 1/1/2005   Vaping Use    Vaping status: Never Used   Substance and Sexual Activity    Alcohol use: No    Drug use: No    Sexual activity: Never

## 2025-04-25 NOTE — DISCHARGE INSTRUCTIONS
[facility]    Discharge Instructions:      Procedure: Upper Endoscopy Findings:    1.  Normal endoscopy.  Biopsies taken    Procedure : Colonoscopy  Findings:    1.  Colon polyp x 1. Small, benign. Removed    Disposition: home      Patient Instructions:     1.  Repeat colonoscopy in 5-10 years  2.  Resume Eliquis in 2 days    Campos De La Rosa MD  Home Care Instructions for Colonoscopy and/or Gastroscopy with Sedation    Diet:  - Resume your regular diet .  - Start with light meals to minimize bloating.  - Do not drink alcohol today.    Medication:  - If you have questions about resuming your normal medications, please contact your Primary Care Physician.    Activities:  - Take it easy today. Do not return to work today.  - Do not drive today.  - Do not operate any machinery today (including kitchen equipment).    Colonoscopy:  - You may notice some rectal \"spotting\" (a little blood on the toilet tissue) for a day or two after the exam. This is normal.  - If you experience any rectal bleeding (not spotting), persistent tenderness or sharp severe abdominal pains, oral temperature over 100 degrees Fahrenheit, light-headedness or dizziness, or any other problems, contact your doctor.    Gastroscopy:  - You may have a sore throat for 2-3 days following the exam. This is normal. Gargling with warm salt water (1/2 tsp salt to 1 glass warm water) or using throat lozenges will help.  - If you experience any sharp pain in your neck, abdomen or chest, vomiting of blood, oral temperature over 100 degrees Fahrenheit, light-headedness or dizziness, or any other problems, contact your doctor.    **If unable to reach your doctor, please go to the [facility] Emergency Room**    - Your referring physician will receive a full report of your examination.  - If you do not hear from your doctor's office within two weeks of your biopsy, please call them for your results.

## 2025-04-25 NOTE — H&P
[facility] GASTROENTEROLOGY    REFERRING PHYSICIAN: Dr. Tai    Chuck Kirby Jr. is a 49 year old male.  Fe def anemia    See [facility] note reviewed from 2/24/25    PROCEDURE: EGD colon    Allergies: Ampicillin, Nsaids, and Perflutren [propane]  Prior to Admission Medications[1]  Past Medical History[2]  Past Surgical History[3]  Short Social Hx on File[4]      Exam:  Ht 5' 10\" (1.778 m)   Wt (!) 305 lb (138.3 kg)   BMI 43.76 kg/m²  - Body mass index is 43.76 kg/m²., A+0x3, HEENT: non-icteric, LUNGS: CTA, HEART: RRR, ABDOMEN:+BS, soft, non-tender, no guarding, EXTREM: no peripheral edema      ASSESSMENT AND PLAN:  Chuck Kirby Jr. is a 49 year old male.  Fe def anemia    1.  EGD colon    I have discussed the risks and benefits and alternatives with the patient/family.  They understand and agree to proceed with the plan of care.    I have reviewed the History and Physical performed.  I have examined this patient today and any changes are documented above.     Campos De La Rosa MD  4/25/2025  1:06 PM         [1]   No current outpatient medications on file.   [2]   Past Medical History:   Allergic rhinitis due to other allergen    Anesthesia complication    difficulty waking up with general anesthesia, combative    Back problem    Bilateral dry eyes    Blood clot of neck vein    Cardiomyopathy (HCC)    Dilated cardiomyopathy (HCC)    with pacemaker    Failure of implantable cardioverter-defibrillator (ICD) lead    Glaucoma suspect, bilateral    + fh     High blood pressure    History of adverse reaction to anesthesia    combative with general anesthesia    Mitral regurgitation    Obesity, unspecified   [3]   Past Surgical History:  Procedure Laterality Date    Cardiac catherterization (pbp)  09/11/2006    normal arteries    Cardiac defibrillator placement      Cardiac pacemaker placement      Fracture surgery      Laser in situ keratomileusis Bilateral 2005    Other surgical history  07/21/2006    medtronic  BiV-ICD    Other surgical history      traumatic amputation of the toe. LT 4th    Other surgical history Right 2015    Rt knee arthroscopy, Dr Gallo   [4]   Social History  Socioeconomic History    Marital status: Single   Tobacco Use    Smoking status: Never    Smokeless tobacco: Former     Types: Snuff     Quit date: 1/1/2005   Vaping Use    Vaping status: Never Used   Substance and Sexual Activity    Alcohol use: No    Drug use: No    Sexual activity: Never     Social Drivers of Health     Food Insecurity: No Food Insecurity (6/19/2024)    Food Insecurity     Food Insecurity: Never true   Transportation Needs: No Transportation Needs (6/19/2024)    Transportation Needs     Lack of Transportation: No   Housing Stability: Low Risk  (6/19/2024)    Housing Stability     Housing Instability: No

## 2025-04-25 NOTE — ANESTHESIA POSTPROCEDURE EVALUATION
[facility]    Chuck Kirby Jr. Patient Status:  Hospital Outpatient Surgery   Age/Gender 49 year old male MRN [id number]   Location [facility] ENDOSCOPY [facility]IN [facility] Attending Campos De La Rosa MD   Hosp Day # 0 PCP Ld Tai MD       Anesthesia Post-op Note    ESOPHAGOGASTRODUODENOSCOPY (EGD) with biopsies , COLONOSCOPY with cold snare polypectomy    Procedure Summary       Date: 04/25/25 Room / Location:  ENDOSCOPY 03 / [facility] END[facility]COPY    Anesthesia Start: 1319 Anesthesia Stop: 1401    Procedures:       ESOPHAGOGASTRODUODENOSCOPY (EGD) with biopsies , COLONOSCOPY with cold snare polypectomy      COLONOSCOPY Diagnosis: (normal  colon:polyp)    Surgeons: Campos De La Rosa MD Anesthesiologist: Luis Antonio Garcia MD    Anesthesia Type: MAC ASA Status: 3            Anesthesia Type: MAC    Vitals Value Taken Time   /71 04/25/25 14:02        Pulse 96 04/25/25 14:02   Resp 18 04/25/25 14:02   SpO2 95 % 04/25/25 14:02   Vitals shown include unfiled device data.        Patient Location: Endoscopy    Anesthesia Type: MAC    Airway Patency: patent    Postop Pain Control: adequate    Mental Status: mildly sedated but able to meaningfully participate in the post-anesthesia evaluation    Nausea/Vomiting: none    Cardiopulmonary/Hydration status: stable euvolemic    Complications: no apparent anesthesia related complications    Postop vital signs: stable    Dental Exam: Unchanged from Preop    Patient to be discharged home when criteria met.

## 2025-05-15 ENCOUNTER — TELEPHONE (OUTPATIENT)
Facility: LOCATION | Age: 50
End: 2025-05-15

## 2025-05-21 DIAGNOSIS — R93.2 ABNORMAL LIVER CT: Primary | ICD-10-CM

## 2025-05-21 DIAGNOSIS — R93.2 ABNORMAL LIVER ULTRASOUND: ICD-10-CM

## 2025-05-21 NOTE — IMAGING NOTE
Called and spoke to patient regarding MRI scheduled. Explained that it cannot be done as patient has a non-conditional ICD due to mixed systems. Also received ICD order from Dr Hoover's office stating patient cannot have MRI. Patient is furious about the situation and was stating that he was told that a Rep will be here for it. I explained that Dr Hoover sent us an order stating MRI cannot be done.   Patient was instructed to reach out to Dr Ramirez's office to let them know that MRI cannot be done and he may need to have an alternate imaging to be done instead of MRI. Patient not happy about it but verbalized understanding.    Also called Dr Ramirez's office and left message that MRI cannot be done due to patient having a non-MRI conditional device and will need a different imaging to be done. Left voicemail to return our call for further questions.

## 2025-05-27 ENCOUNTER — APPOINTMENT (OUTPATIENT)
Dept: GENERAL RADIOLOGY | Facility: HOSPITAL | Age: 50
End: 2025-05-27
Attending: INTERNAL MEDICINE
Payer: COMMERCIAL

## 2025-05-27 ENCOUNTER — HOSPITAL ENCOUNTER (OUTPATIENT)
Dept: MRI IMAGING | Facility: HOSPITAL | Age: 50
Discharge: HOME | End: 2025-05-27
Attending: INTERNAL MEDICINE
Payer: COMMERCIAL

## 2025-05-30 ENCOUNTER — HOSPITAL ENCOUNTER (OUTPATIENT)
Dept: CT IMAGING | Facility: HOSPITAL | Age: 50
Discharge: HOME | End: 2025-05-30
Attending: INTERNAL MEDICINE
Payer: COMMERCIAL

## 2025-05-30 DIAGNOSIS — R93.2 ABNORMAL LIVER ULTRASOUND: ICD-10-CM

## 2025-05-30 PROCEDURE — 74170 CT ABD WO CNTRST FLWD CNTRST: CPT | Performed by: INTERNAL MEDICINE

## 2025-06-17 NOTE — PROGRESS NOTES
[facility] Note    Visit Diagnosis:  1. Venous thromboembolism    2. Iron deficiency        History of Present Illness: 50M is here to discuss anticoagulation.    -50M with a PMH of Cardiomyopathy and pacemaker presented on 6/19/24 with LUE swelling. On 6/17/24, he met with his dentist for left cheek swelling. No abscess per report. But there was suspicion for a dental infection and he was started on clindamycin with improvement in cheek swelling. He noticed LUE swellling, warmth and tingling around the same time. LUE doppler on 6/19/24 showed an age indeterminate DVT in the left internal jugular vein. CTA chest showed no PE. There was an 8 mm hypodense focus in the right hepatic lobe. US liver with fatty liver and MR liver can be considered. CT neck did not show any suspicious LN or significant clot burden. No personal or FH of VTE. No hormone use. No recent IV placement or trauma. His JAK2/CALR/MPL were negative. His Lupus anticoagulant was positive and repeat testing was recommended. HE was discharged on Xarelto.     -On 9/7/24, he had a follow up LUE US which showed a chronic partial left internal jugular DVT with improved FLOW.    -02/22/25: LUE Doppler with no DVT    Interval History  -CT liver showed diffuse fatty liver, 9mm R hepatic lobe lesion that appears benign, splenomegaly (14.9 cm), bilateral nephrolithiasis.   -Repeat APLS panel negative 03/24/25  -EGD/Colonoscopy reviewed from 04/2025-unremarkable   -He is on ASA 81   -He is on Xarelto   Body mass index is 43.62 kg/m².    Review of Systems: 12 Point ROS was completed and pertinent positives are in the HPI    Medications Ordered Prior to Encounter[1]  Past Medical History:    Allergic rhinitis due to other allergen    Anesthesia complication    difficulty waking up with general anesthesia, combative    Back problem    Bilateral dry eyes    Blood clot of neck vein    Cardiomyopathy (HCC)    Dilated cardiomyopathy (HCC)     with pacemaker    Failure of implantable cardioverter-defibrillator (ICD) lead    Glaucoma suspect, bilateral    + fh     High blood pressure    History of adverse reaction to anesthesia    combative with general anesthesia    Mitral regurgitation    Obesity, unspecified     Past Surgical History:   Procedure Laterality Date    Cardiac catherterization (pbp)  09/11/2006    normal arteries    Cardiac defibrillator placement      Cardiac pacemaker placement      Colonoscopy N/A 4/25/2025    Procedure: COLONOSCOPY;  Surgeon: Campos De La Rosa MD;  Location:  ENDOSCOPY    Fracture surgery      Laser in situ keratomileusis Bilateral 2005    Other surgical history  07/21/2006    medtronic BiV-ICD    Other surgical history      traumatic amputation of the toe. LT 4th    Other surgical history Right 2015    Rt knee arthroscopy, Dr Gallo     Social History     Socioeconomic History    Marital status: Single   Tobacco Use    Smoking status: Never    Smokeless tobacco: Former     Types: Snuff     Quit date: 1/1/2005   Vaping Use    Vaping status: Never Used   Substance and Sexual Activity    Alcohol use: No    Drug use: No    Sexual activity: Never      Family History   Problem Relation Age of Onset    Heart Disorder Mother         DCM    Alcohol and Other Disorders Associated Father     Glaucoma Father     Other (Other) Father         AAA    Stroke Maternal Grandmother     Cancer Maternal Grandfather         Bone    Heart Disease Paternal Grandmother         CABG    Diabetes Paternal Grandfather     Heart Disease Paternal Grandfather         CABG    Breast Cancer Maternal Aunt     Heart Disease Maternal Uncle         rheumatic fever    Depression Sister         SAD       Physical Exam  Height: 177.8 cm (5' 10\") (06/18 1009)  Weight: 137.9 kg (304 lb) (06/18 1009)  BSA (Calculated - sq m): 2.49 sq meters (06/18 1009)  Pulse: 94 (06/18 1009)  BP: 120/81 (06/18 1009)  Temp: 98.5 °F (36.9 °C) (06/18 1009)  Do Not Use - Resp  Rate: --  SpO2: 95 % (06/18 1009)    General: NAD, AOX3  HEENT: clear op, mmm, no jvd, no scleral icterus  LN: no supraclavicular, infraclavicular, axillary or inguinal LAD  CV: RRR S1S2 no murmurs  Extremities: No LUE swelling    Lungs: no increased work of breathing  Neuro: CN: II-XII grossly intact      Results:  Lab Results   Component Value Date    WBC 11.4 (H) 03/24/2025    HGB 14.0 03/24/2025    HCT 43.0 03/24/2025    MCV 79.8 (L) 03/24/2025    .0 03/24/2025     Lab Results   Component Value Date     03/24/2025    K 4.2 03/24/2025    CO2 25.0 03/24/2025     03/24/2025    BUN 13 03/24/2025    ALB 4.4 03/24/2025       No results found for: \"LDH\"    Radiology:   LUE Doppler 6/19/24  CONCLUSION:  Age-indeterminate thrombus within the left internal jugular vein.     LUE Doppler 9/7/24  No acute DVT.  Redemonstration partial clot within the left internal jugular vein as present on the prior ultrasound exam flow appears somewhat improved.  No new or worsening abnormality.  Remainder of the deep venous structures appear patent.     Assessment and Plan:  LIJ DVT  -dx 6/19/24. Possibly related to recent left sided dental infection vs. Abscess.   -Lupus anticoagulant positive initially but repeat NEGATIVE.   -normal Protein C/S ATIII, Factor V and [id number]  -Negative JAK2, CALR, MPL  -He was discharged on Xarelto  -Doppler from 02/2025 shows resolution   -He stopped anticoagulation 6/18/25    8 mm R Hepatic lobe lesion: CT triphasic liver appears to show benign lesion.     Mild Iron Deficiency: persistent. On PO iron. EGD/Colonoscopy 04/2025 unremarkable.     Mild Luekocytosis: present since at 2024. Likely inflammatory. Can repeat labs in 3-6 months.     Plan  Baseline d-dimer. Could be false elevation given his BMI  Continue oral iron + Vit C  Continue ASA as secondary ppx. We discussed 81  is reasonable. He states he prefers to be on 325. He will follow up with cardiology.     RTC in 6  months---MD/Labs     [facility]         [1]   Current Outpatient Medications on File Prior to Visit   Medication Sig Dispense Refill    HYDROcodone-acetaminophen 5-325 MG Oral Tab Take 1-2 tablets by mouth every 6 (six) hours as needed for Pain.      Vitamin C 500 MG Oral Tab Take 1 tablet (500 mg total) by mouth in the morning.      Iron, Ferrous Sulfate, 325 (65 Fe) MG Oral Tab       aspirin 81 MG Oral Chew Tab Chew 1 tablet (81 mg total) by mouth daily with food.      XARELTO 20 MG Oral Tab TAKE 1 TABLET(20 MG) BY MOUTH DAILY WITH FOOD 30 tablet 0    Multiple Vitamins-Minerals (MULTI VITAMIN/MINERALS) Oral Tab Take 1 tablet by mouth in the morning.      Omega 3-6-9 Fatty Acids (OMEGA 3-6-9 COMPLEX OR) Take 1 capsule by mouth in the morning.      Cholecalciferol (VITAMIN D3) 250 MCG (29395 UT) Oral Cap Take 1 tablet by mouth in the morning.      cycloSPORINE (RESTASIS) 0.05 % Ophthalmic Emulsion Place 1 drop into both eyes 2 (two) times daily. 60 mL 6    Enalapril Maleate 5 MG Oral Tab Take 1 tablet (5 mg total) by mouth in the morning and 1 tablet (5 mg total) before bedtime.      carvedilol 25 MG Oral Tab Take 1 1/2 tablets BID      ALLEGRA 180 MG OR TABS Take by mouth.       Current Facility-Administered Medications on File Prior to Visit   Medication Dose Route Frequency Provider Last Rate Last Admin    [COMPLETED] iopamidol 76% (ISOVUE-370) injection for power injector  100 mL Intravenous ONCE PRN Ld Tai MD   100 mL at 05/30/25 1910

## 2025-06-18 ENCOUNTER — OFFICE VISIT (OUTPATIENT)
Age: 50
End: 2025-06-18
Attending: INTERNAL MEDICINE
Payer: COMMERCIAL

## 2025-06-18 VITALS
HEIGHT: 70 IN | HEART RATE: 94 BPM | DIASTOLIC BLOOD PRESSURE: 81 MMHG | OXYGEN SATURATION: 95 % | TEMPERATURE: 99 F | WEIGHT: 304 LBS | BODY MASS INDEX: 43.52 KG/M2 | SYSTOLIC BLOOD PRESSURE: 120 MMHG | RESPIRATION RATE: 16 BRPM

## 2025-06-18 DIAGNOSIS — I82.90 VENOUS THROMBOEMBOLISM: Primary | ICD-10-CM

## 2025-06-18 DIAGNOSIS — E61.1 IRON DEFICIENCY: ICD-10-CM

## 2025-06-18 LAB
BASOPHILS # BLD AUTO: 0.1 X10(3) UL (ref 0–0.2)
BASOPHILS NFR BLD AUTO: 0.9 %
D DIMER PPP FEU-MCNC: <0.27 UG/ML FEU (ref ?–0.5)
DEPRECATED HBV CORE AB SER IA-ACNC: 79 NG/ML (ref 50–336)
EOSINOPHIL # BLD AUTO: 0.38 X10(3) UL (ref 0–0.7)
EOSINOPHIL NFR BLD AUTO: 3.2 %
ERYTHROCYTE [DISTWIDTH] IN BLOOD BY AUTOMATED COUNT: 14.6 %
HCT VFR BLD AUTO: 43.9 % (ref 39–53)
HGB BLD-MCNC: 14.1 G/DL (ref 13–17.5)
IMM GRANULOCYTES # BLD AUTO: 0.09 X10(3) UL (ref 0–1)
IMM GRANULOCYTES NFR BLD: 0.8 %
IRON SATN MFR SERPL: 12 % (ref 20–50)
IRON SERPL-MCNC: 39 UG/DL (ref 65–175)
LYMPHOCYTES # BLD AUTO: 2.73 X10(3) UL (ref 1–4)
LYMPHOCYTES NFR BLD AUTO: 23.3 %
MCH RBC QN AUTO: 25.5 PG (ref 26–34)
MCHC RBC AUTO-ENTMCNC: 32.1 G/DL (ref 31–37)
MCV RBC AUTO: 79.5 FL (ref 80–100)
MONOCYTES # BLD AUTO: 0.79 X10(3) UL (ref 0.1–1)
MONOCYTES NFR BLD AUTO: 6.7 %
NEUTROPHILS # BLD AUTO: 7.62 X10 (3) UL (ref 1.5–7.7)
NEUTROPHILS # BLD AUTO: 7.62 X10(3) UL (ref 1.5–7.7)
NEUTROPHILS NFR BLD AUTO: 65.1 %
PLATELET # BLD AUTO: 373 10(3)UL (ref 150–450)
RBC # BLD AUTO: 5.52 X10(6)UL (ref 4.3–5.7)
TOTAL IRON BINDING CAPACITY: 334 UG/DL (ref 250–425)
TRANSFERRIN SERPL-MCNC: 253 MG/DL (ref 215–365)
WBC # BLD AUTO: 11.7 X10(3) UL (ref 4–11)

## 2025-06-18 NOTE — PROGRESS NOTES
Education Record    Learner:  Patient    Disease / Diagnosis:    Barriers / Limitations:  None   Comments:    Method:  Discussion   Comments:    General Topics:  Plan of care reviewed   Comments:    Outcome:  Shows understanding   Comments: MD f/up. Looking to discuss CT scan. States he has been taking xarelto, looking to discuss discontinuing today. Taking babay aspirin and waiting to discuss full dose aspiring once off xarelto. States had elevated WBC count with PCP labs.

## 2025-07-22 RX ORDER — FENUGREEK SEED 610 MG
1 CAPSULE ORAL DAILY
COMMUNITY

## 2025-07-28 ENCOUNTER — HOSPITAL ENCOUNTER (OUTPATIENT)
Dept: INTERVENTIONAL RADIOLOGY/VASCULAR | Facility: HOSPITAL | Age: 50
Discharge: HOME | End: 2025-07-28
Attending: INTERNAL MEDICINE | Admitting: INTERNAL MEDICINE

## 2025-07-28 VITALS
WEIGHT: 300 LBS | HEIGHT: 69.5 IN | TEMPERATURE: 98 F | SYSTOLIC BLOOD PRESSURE: 134 MMHG | OXYGEN SATURATION: 95 % | BODY MASS INDEX: 43.44 KG/M2 | DIASTOLIC BLOOD PRESSURE: 89 MMHG | RESPIRATION RATE: 20 BRPM | HEART RATE: 85 BPM

## 2025-07-28 DIAGNOSIS — Z45.02 ICD (IMPLANTABLE CARDIOVERTER-DEFIBRILLATOR) BATTERY DEPLETION: ICD-10-CM

## 2025-07-28 PROCEDURE — 99153 MOD SED SAME PHYS/QHP EA: CPT | Performed by: INTERNAL MEDICINE

## 2025-07-28 PROCEDURE — 33264 RMVL & RPLCMT DFB GEN MLT LD: CPT | Performed by: INTERNAL MEDICINE

## 2025-07-28 PROCEDURE — 99152 MOD SED SAME PHYS/QHP 5/>YRS: CPT | Performed by: INTERNAL MEDICINE

## 2025-07-28 RX ORDER — MIDAZOLAM HYDROCHLORIDE 1 MG/ML
INJECTION INTRAMUSCULAR; INTRAVENOUS
Status: COMPLETED
Start: 2025-07-28 | End: 2025-07-28

## 2025-07-28 RX ORDER — LIDOCAINE HYDROCHLORIDE 10 MG/ML
INJECTION, SOLUTION EPIDURAL; INFILTRATION; INTRACAUDAL; PERINEURAL
Status: COMPLETED
Start: 2025-07-28 | End: 2025-07-28

## 2025-07-28 RX ORDER — CHLORHEXIDINE GLUCONATE 40 MG/ML
SOLUTION TOPICAL
Status: COMPLETED | OUTPATIENT
Start: 2025-07-28 | End: 2025-07-28

## 2025-07-28 RX ORDER — VANCOMYCIN HYDROCHLORIDE 1 G/20ML
INJECTION, POWDER, LYOPHILIZED, FOR SOLUTION INTRAVENOUS
Status: COMPLETED
Start: 2025-07-28 | End: 2025-07-28

## 2025-07-28 RX ORDER — SODIUM CHLORIDE 9 MG/ML
INJECTION, SOLUTION INTRAVENOUS
Status: COMPLETED | OUTPATIENT
Start: 2025-07-29 | End: 2025-07-28

## 2025-07-28 RX ADMIN — SODIUM CHLORIDE: 9 INJECTION, SOLUTION INTRAVENOUS at 07:01:00

## 2025-07-28 RX ADMIN — CHLORHEXIDINE GLUCONATE: 40 SOLUTION TOPICAL at 07:00:00

## 2025-08-07 ENCOUNTER — TELEPHONE (OUTPATIENT)
Facility: LOCATION | Age: 50
End: 2025-08-07

## 2025-08-19 ENCOUNTER — OFFICE VISIT (OUTPATIENT)
Facility: LOCATION | Age: 50
End: 2025-08-19
Attending: INTERNAL MEDICINE

## 2025-08-19 VITALS
RESPIRATION RATE: 16 BRPM | WEIGHT: 300 LBS | BODY MASS INDEX: 42.95 KG/M2 | SYSTOLIC BLOOD PRESSURE: 123 MMHG | DIASTOLIC BLOOD PRESSURE: 83 MMHG | OXYGEN SATURATION: 97 % | HEIGHT: 70 IN | TEMPERATURE: 98 F | HEART RATE: 83 BPM

## 2025-08-19 DIAGNOSIS — E61.1 IRON DEFICIENCY: Primary | ICD-10-CM

## 2025-08-19 LAB
BASOPHILS # BLD AUTO: 0.08 X10(3) UL (ref 0–0.2)
BASOPHILS NFR BLD AUTO: 0.6 %
DEPRECATED HBV CORE AB SER IA-ACNC: 67 NG/ML (ref 50–336)
EOSINOPHIL # BLD AUTO: 0.42 X10(3) UL (ref 0–0.7)
EOSINOPHIL NFR BLD AUTO: 3.2 %
ERYTHROCYTE [DISTWIDTH] IN BLOOD BY AUTOMATED COUNT: 14.6 %
HCT VFR BLD AUTO: 43.3 % (ref 39–53)
HGB BLD-MCNC: 14.3 G/DL (ref 13–17.5)
IMM GRANULOCYTES # BLD AUTO: 0.07 X10(3) UL (ref 0–1)
IMM GRANULOCYTES NFR BLD: 0.5 %
IRON SATN MFR SERPL: 13 % (ref 20–50)
IRON SERPL-MCNC: 45 UG/DL (ref 65–175)
LYMPHOCYTES # BLD AUTO: 3.21 X10(3) UL (ref 1–4)
LYMPHOCYTES NFR BLD AUTO: 24.8 %
MCH RBC QN AUTO: 25.4 PG (ref 26–34)
MCHC RBC AUTO-ENTMCNC: 33 G/DL (ref 31–37)
MCV RBC AUTO: 77 FL (ref 80–100)
MONOCYTES # BLD AUTO: 0.89 X10(3) UL (ref 0.1–1)
MONOCYTES NFR BLD AUTO: 6.9 %
NEUTROPHILS # BLD AUTO: 8.27 X10 (3) UL (ref 1.5–7.7)
NEUTROPHILS # BLD AUTO: 8.27 X10(3) UL (ref 1.5–7.7)
NEUTROPHILS NFR BLD AUTO: 64 %
PLATELET # BLD AUTO: 402 10(3)UL (ref 150–450)
RBC # BLD AUTO: 5.62 X10(6)UL (ref 4.3–5.7)
TOTAL IRON BINDING CAPACITY: 346 UG/DL (ref 250–425)
TRANSFERRIN SERPL-MCNC: 268 MG/DL (ref 215–365)
VIT B12 SERPL-MCNC: 472 PG/ML (ref 211–911)
WBC # BLD AUTO: 12.9 X10(3) UL (ref 4–11)

## 2025-08-22 ENCOUNTER — TELEPHONE (OUTPATIENT)
Facility: LOCATION | Age: 50
End: 2025-08-22

## (undated) DEVICE — 10FT COMBINED O2 DELIVERY/CO2 MONITORING. FILTER WITH MICROSTREAM TYPE LUER: Brand: DUAL ADULT NASAL CANNULA

## (undated) DEVICE — KIT CUSTOM ENDOPROCEDURE STERIS

## (undated) DEVICE — BITEBLOCK ENDOSCP 60FR MAXI STRP

## (undated) DEVICE — 3M™ RED DOT™ MONITORING ELECTRODE WITH FOAM TAPE AND STICKY GEL, 50/BAG, 20/CASE, 72/PLT 2570: Brand: RED DOT™

## (undated) DEVICE — LASSO POLYPECTOMY SNARE: Brand: LASSO

## (undated) DEVICE — 1200CC GUARDIAN II: Brand: GUARDIAN

## (undated) DEVICE — V2 SPECIMEN COLLECTION MANIFOLD KIT: Brand: NEPTUNE

## (undated) DEVICE — KIT VLV 5 PC AIR H2O SUCT BX ENDOGATOR CONN

## (undated) NOTE — LETTER
[employer] Cardiac Device Communication Tool    Preop to complete    [facility] Phone: [phone] Fax: [phone]   Patient Name Chuck Kirby Jr.   Patient  - AGE - SEX 1975 - A: 49 y - male   Surgical Date 2025   Surgical Procedure ESOPHAGOGASTRODUODENOSCOPY (EGD), COLONOSCOPY   Surgical [facility]   Type of cautery anticipated: Monopolar   SUPINE       Device Clinic to Complete the Information Below, Sign and Fax to [phone]    Pacemaker or ICD    Atrial or atrial-ventricular lead?        Indication for device    Is patient pacemaker dependent?    Has pt had routine f/u and is battery life > 3 months    Is ICD programmed to inhibit therapy w/magnet?    Does device have rate response or other sensor?      Surgical Procedure above [facility] Surgical Procedure @ [facility] and below   < 6 in. from ICD: Reprogram therapies OFF w/  asynchronous pacing if PM dependent  < 6 in. from PM: Reprogram asynchronous if PM   dependent ICD: No Change  PM: No Change   > 6 in. from ICD: Magnet*  > 6 in. from PM:  No Change*  * If PM dependent observe for pacing inhibition and minimize cautery if inhibition is seen                                              Bipolar cautery: No Change   Cardiac Device Management Plan (check one)            ___  Reprogram (PAT Dept to provide Rep w/ arrival date/time)   ___ Magnet    ___ No Change    Comments: ___________________________________________________________________        Signature: ________________________________ Date: ____________ Time: ______________     Print Name: ___________________________________

## (undated) NOTE — LETTER
[facility] Communication Tool    Preop to complete    [facility] Phone: [phone] Fax: [phone]   Patient Name Chuck Kirby Jr.   Patient  - AGE - SEX 1975 - A: 49 y - male   Surgical Date 2025   Surgical Procedure ESOPHAGOGASTRODUODENOSCOPY (EGD), COLONOSCOPY   Surgical Location [facility]   Type of cautery anticipated: Monopolar   supine      Device Clinic to Complete the Information Below, Sign and Fax to [phone]    Pacemaker or ICD    Atrial or atrial-ventricular lead?        Indication for device    Is patient pacemaker dependent?    Has pt had routine f/u and is battery life > 3 months    Is ICD programmed to inhibit therapy w/magnet?    Does device have rate response or other sensor?      Surgical Procedure above [facility] Surgical Procedure @ I[place] [place] and below   < 6 in. from ICD: Reprogram therapies OFF w/  asynchronous pacing if PM dependent  < 6 in. from PM: Reprogram asynchronous if PM   dependent ICD: No Change  PM: No Change   > 6 in. from ICD: Magnet*  > 6 in. from PM:  No Change*  * If PM dependent observe for pacing inhibition and minimize cautery if inhibition is seen                                              Bipolar cautery: No Change   Cardiac Device Management Plan (check one)            ___  Reprogram (PAT Dept to provide Rep w/ arrival date/time)   ___ Magnet    ___ No Change    Comments: ___________________________________________________________________        Signature: ________________________________ Date: ____________ Time: ______________     Print Name: ___________________________________